# Patient Record
Sex: MALE | Race: ASIAN | NOT HISPANIC OR LATINO | Employment: FULL TIME | URBAN - METROPOLITAN AREA
[De-identification: names, ages, dates, MRNs, and addresses within clinical notes are randomized per-mention and may not be internally consistent; named-entity substitution may affect disease eponyms.]

---

## 2017-05-24 ENCOUNTER — HOSPITAL ENCOUNTER (OUTPATIENT)
Dept: RADIOLOGY | Facility: MEDICAL CENTER | Age: 57
End: 2017-05-24
Attending: PHYSICIAN ASSISTANT
Payer: COMMERCIAL

## 2017-05-24 DIAGNOSIS — R06.02 SOB (SHORTNESS OF BREATH): ICD-10-CM

## 2017-05-24 PROCEDURE — 71020 DX-CHEST-2 VIEWS: CPT

## 2017-06-10 ENCOUNTER — HOSPITAL ENCOUNTER (OUTPATIENT)
Dept: LAB | Facility: MEDICAL CENTER | Age: 57
End: 2017-06-10
Attending: PHYSICIAN ASSISTANT
Payer: COMMERCIAL

## 2017-06-10 LAB
25(OH)D3 SERPL-MCNC: 59 NG/ML (ref 30–100)
ALBUMIN SERPL BCP-MCNC: 4 G/DL (ref 3.2–4.9)
ALBUMIN/GLOB SERPL: 1.1 G/DL
ALP SERPL-CCNC: 56 U/L (ref 30–99)
ALT SERPL-CCNC: 28 U/L (ref 2–50)
ANION GAP SERPL CALC-SCNC: 8 MMOL/L (ref 0–11.9)
APPEARANCE UR: CLEAR
AST SERPL-CCNC: 28 U/L (ref 12–45)
BASOPHILS # BLD AUTO: 0.9 % (ref 0–1.8)
BASOPHILS # BLD: 0.05 K/UL (ref 0–0.12)
BILIRUB SERPL-MCNC: 0.7 MG/DL (ref 0.1–1.5)
BILIRUB UR QL STRIP.AUTO: NEGATIVE
BUN SERPL-MCNC: 21 MG/DL (ref 8–22)
CALCIUM SERPL-MCNC: 9.4 MG/DL (ref 8.5–10.5)
CHLORIDE SERPL-SCNC: 103 MMOL/L (ref 96–112)
CHOLEST SERPL-MCNC: 171 MG/DL (ref 100–199)
CO2 SERPL-SCNC: 27 MMOL/L (ref 20–33)
COLOR UR: NORMAL
CREAT SERPL-MCNC: 0.81 MG/DL (ref 0.5–1.4)
CREAT UR-MCNC: 110.2 MG/DL
CRP SERPL HS-MCNC: 1 MG/L (ref 0–7.5)
CULTURE IF INDICATED INDCX: NO UA CULTURE
EOSINOPHIL # BLD AUTO: 0.6 K/UL (ref 0–0.51)
EOSINOPHIL NFR BLD: 11.1 % (ref 0–6.9)
ERYTHROCYTE [DISTWIDTH] IN BLOOD BY AUTOMATED COUNT: 44.5 FL (ref 35.9–50)
EST. AVERAGE GLUCOSE BLD GHB EST-MCNC: 126 MG/DL
GFR SERPL CREATININE-BSD FRML MDRD: >60 ML/MIN/1.73 M 2
GLOBULIN SER CALC-MCNC: 3.6 G/DL (ref 1.9–3.5)
GLUCOSE SERPL-MCNC: 93 MG/DL (ref 65–99)
GLUCOSE UR STRIP.AUTO-MCNC: NEGATIVE MG/DL
HBA1C MFR BLD: 6 % (ref 0–5.6)
HCT VFR BLD AUTO: 47.7 % (ref 42–52)
HDLC SERPL-MCNC: 57 MG/DL
HGB BLD-MCNC: 15.9 G/DL (ref 14–18)
KETONES UR STRIP.AUTO-MCNC: NEGATIVE MG/DL
LDLC SERPL CALC-MCNC: 102 MG/DL
LEUKOCYTE ESTERASE UR QL STRIP.AUTO: NEGATIVE
LYMPHOCYTES # BLD AUTO: 1.7 K/UL (ref 1–4.8)
LYMPHOCYTES NFR BLD: 31.5 % (ref 22–41)
MANUAL DIFF BLD: NORMAL
MCH RBC QN AUTO: 31.2 PG (ref 27–33)
MCHC RBC AUTO-ENTMCNC: 33.3 G/DL (ref 33.7–35.3)
MCV RBC AUTO: 93.7 FL (ref 81.4–97.8)
MICRO URNS: NORMAL
MICROALBUMIN UR-MCNC: 0.8 MG/DL
MICROALBUMIN/CREAT UR: 7 MG/G (ref 0–30)
MONOCYTES # BLD AUTO: 0.35 K/UL (ref 0–0.85)
MONOCYTES NFR BLD AUTO: 6.5 % (ref 0–13.4)
MORPHOLOGY BLD-IMP: NORMAL
NEUTROPHILS # BLD AUTO: 2.7 K/UL (ref 1.82–7.42)
NEUTROPHILS NFR BLD: 49.1 % (ref 44–72)
NEUTS BAND NFR BLD MANUAL: 0.9 % (ref 0–10)
NITRITE UR QL STRIP.AUTO: NEGATIVE
NRBC # BLD AUTO: 0 K/UL
NRBC BLD AUTO-RTO: 0 /100 WBC
PH UR STRIP.AUTO: 6.5 [PH]
PLATELET # BLD AUTO: 485 K/UL (ref 164–446)
PLATELET BLD QL SMEAR: NORMAL
PMV BLD AUTO: 8.9 FL (ref 9–12.9)
POTASSIUM SERPL-SCNC: 3.9 MMOL/L (ref 3.6–5.5)
PROT SERPL-MCNC: 7.6 G/DL (ref 6–8.2)
PROT UR QL STRIP: NEGATIVE MG/DL
PSA SERPL-MCNC: 0.84 NG/ML (ref 0–4)
RBC # BLD AUTO: 5.09 M/UL (ref 4.7–6.1)
RBC BLD AUTO: PRESENT
RBC UR QL AUTO: NEGATIVE
SMUDGE CELLS BLD QL SMEAR: NORMAL
SODIUM SERPL-SCNC: 138 MMOL/L (ref 135–145)
SP GR UR STRIP.AUTO: 1.02
T3FREE SERPL-MCNC: 3.69 PG/ML (ref 2.4–4.2)
T4 FREE SERPL-MCNC: 0.93 NG/DL (ref 0.53–1.43)
TRIGL SERPL-MCNC: 62 MG/DL (ref 0–149)
TSH SERPL DL<=0.005 MIU/L-ACNC: 1.78 UIU/ML (ref 0.3–3.7)
WBC # BLD AUTO: 5.4 K/UL (ref 4.8–10.8)

## 2017-06-10 PROCEDURE — 85007 BL SMEAR W/DIFF WBC COUNT: CPT

## 2017-06-10 PROCEDURE — 85027 COMPLETE CBC AUTOMATED: CPT

## 2017-06-10 PROCEDURE — 84481 FREE ASSAY (FT-3): CPT

## 2017-06-10 PROCEDURE — 84439 ASSAY OF FREE THYROXINE: CPT

## 2017-06-10 PROCEDURE — 84402 ASSAY OF FREE TESTOSTERONE: CPT

## 2017-06-10 PROCEDURE — 84270 ASSAY OF SEX HORMONE GLOBUL: CPT

## 2017-06-10 PROCEDURE — 84443 ASSAY THYROID STIM HORMONE: CPT

## 2017-06-10 PROCEDURE — 82043 UR ALBUMIN QUANTITATIVE: CPT

## 2017-06-10 PROCEDURE — 84153 ASSAY OF PSA TOTAL: CPT

## 2017-06-10 PROCEDURE — 82570 ASSAY OF URINE CREATININE: CPT

## 2017-06-10 PROCEDURE — 81003 URINALYSIS AUTO W/O SCOPE: CPT

## 2017-06-10 PROCEDURE — 80053 COMPREHEN METABOLIC PANEL: CPT

## 2017-06-10 PROCEDURE — 84403 ASSAY OF TOTAL TESTOSTERONE: CPT

## 2017-06-10 PROCEDURE — 86141 C-REACTIVE PROTEIN HS: CPT

## 2017-06-10 PROCEDURE — 36415 COLL VENOUS BLD VENIPUNCTURE: CPT

## 2017-06-10 PROCEDURE — 83036 HEMOGLOBIN GLYCOSYLATED A1C: CPT

## 2017-06-10 PROCEDURE — 80061 LIPID PANEL: CPT

## 2017-06-10 PROCEDURE — 82306 VITAMIN D 25 HYDROXY: CPT

## 2017-06-12 LAB
SHBG SERPL-SCNC: 37 NMOL/L (ref 11–80)
TESTOST FREE MFR SERPL: 1.7 % (ref 1.6–2.9)
TESTOST FREE SERPL-MCNC: 45 PG/ML (ref 47–244)
TESTOST SERPL-MCNC: 270 NG/DL (ref 300–890)

## 2017-06-15 ENCOUNTER — HOSPITAL ENCOUNTER (OUTPATIENT)
Dept: RADIOLOGY | Facility: MEDICAL CENTER | Age: 57
End: 2017-06-15
Attending: PHYSICIAN ASSISTANT
Payer: COMMERCIAL

## 2017-06-15 DIAGNOSIS — J45.909 UNCOMPLICATED ASTHMA, UNSPECIFIED ASTHMA SEVERITY: ICD-10-CM

## 2017-06-15 DIAGNOSIS — R06.02 SHORTNESS OF BREATH: ICD-10-CM

## 2017-06-15 PROCEDURE — 71020 DX-CHEST-2 VIEWS: CPT

## 2017-09-29 ENCOUNTER — OFFICE VISIT (OUTPATIENT)
Dept: CARDIOLOGY | Facility: MEDICAL CENTER | Age: 57
End: 2017-09-29
Payer: COMMERCIAL

## 2017-09-29 VITALS
OXYGEN SATURATION: 96 % | HEART RATE: 66 BPM | BODY MASS INDEX: 29.64 KG/M2 | SYSTOLIC BLOOD PRESSURE: 120 MMHG | HEIGHT: 61 IN | WEIGHT: 157 LBS | DIASTOLIC BLOOD PRESSURE: 80 MMHG

## 2017-09-29 DIAGNOSIS — I10 ESSENTIAL HYPERTENSION: Chronic | ICD-10-CM

## 2017-09-29 DIAGNOSIS — R07.9 CHEST PAIN, UNSPECIFIED TYPE: ICD-10-CM

## 2017-09-29 PROCEDURE — 99204 OFFICE O/P NEW MOD 45 MIN: CPT | Performed by: INTERNAL MEDICINE

## 2017-09-29 PROCEDURE — 93000 ELECTROCARDIOGRAM COMPLETE: CPT | Performed by: INTERNAL MEDICINE

## 2017-09-29 ASSESSMENT — ENCOUNTER SYMPTOMS
ABDOMINAL PAIN: 0
DEPRESSION: 0
PALPITATIONS: 0
BRUISES/BLEEDS EASILY: 0
SHORTNESS OF BREATH: 0
FALLS: 0
DIZZINESS: 0
PND: 0
LOSS OF CONSCIOUSNESS: 0
ORTHOPNEA: 0

## 2017-09-29 NOTE — LETTER
[facility]   [address], [facility] [address]  [place] [address]  Phone: [phone]  Fax: [phone]              Kb Guy  1960    Encounter Date: 9/29/2017    Lakisha Malagon M.D.          PROGRESS NOTE:  Subjective:   Kb Guy is a 57 y.o. Male With a past medical history significant for hypertension and was referred to cardiology clinic for further management of chest discomfort. Patient reports being in his usual state of health until recently when he presented to the ER with bilateral nonradiating pinching pain that usually occurred with eating spicy foods and spontaneously resolved in about one hour. He was thought to have GERD and he was started on a PPI about 2 weeks ago. Since then he has not had any recurrent symptoms.     Patient reports having significant anxiety with going to doctors visits due to the copayment.  He does not like taking medications.  He was diagnosed with obstructive sleep apnea but patient stopped following up with the doctor.    Past Medical History:   Diagnosis Date   • Hypertension    • Sleep apnea      Past Surgical History:   Procedure Laterality Date   • SINUSCOPE       History reviewed. No pertinent family history.     History   Smoking Status   • Former Smoker   Smokeless Tobacco   • Never Used     Very rare alcohol use. No recreational drug use.    No Known Allergies  Outpatient Encounter Prescriptions as of 9/29/2017   Medication Sig Dispense Refill   • oxycodone immediate-release (ROXICODONE) 5 MG Tab Take 1 Tab by mouth every 8 hours as needed for Severe Pain. 10 Tab 0   • omeprazole (PRILOSEC) 40 MG delayed-release capsule Take 1 Cap by mouth every day. 30 Cap 0   • amlodipine (NORVASC) 10 MG Tab Take 10 mg by mouth every day.     • cyanocobalamin (VITAMIN B12) 1000 MCG Tab Take 1,000 mcg by mouth every day.     • Non Formulary Request Take 1 Tab by mouth every day. Indications: detox multi vit for  "weight loss     • Non Formulary Request Take 1 Tab by mouth every day. Indications: multi vitamin for weight loss     • budesonide-formoterol (SYMBICORT) 160-4.5 MCG/ACT AERO Inhale 2 Puffs by mouth 2 Times a Day.     • aspirin EC (ECOTRIN) 81 MG TBEC Take 81 mg by mouth every day.     • acetaminophen 650 MG Tab Take 650 mg by mouth every 6 hours as needed for Mild Pain (Mild Pain; (Pain scale 1-3); Temp greater than 100.5 F). 30 Tab 0     No facility-administered encounter medications on file as of 9/29/2017.      Review of Systems   Constitutional: Negative for malaise/fatigue.   HENT: Negative.    Respiratory: Negative for shortness of breath.    Cardiovascular: Positive for chest pain. Negative for palpitations, orthopnea, leg swelling and PND.   Gastrointestinal: Negative for abdominal pain.   Musculoskeletal: Negative for falls.   Skin: Negative.    Neurological: Negative for dizziness and loss of consciousness.   Endo/Heme/Allergies: Does not bruise/bleed easily.   Psychiatric/Behavioral: Negative for depression.   All other systems reviewed and are negative.       Objective:   /80   Pulse 66   Ht 1.549 m (5' 1\")   Wt 71.2 kg (157 lb)   SpO2 96%   BMI 29.66 kg/m²      Physical Exam   Constitutional: He is oriented to person, place, and time. No distress.   HENT:   Head: Normocephalic and atraumatic.   Eyes: Conjunctivae are normal.   Neck: Normal range of motion. Neck supple. No JVD present.   Cardiovascular: Normal rate, regular rhythm and normal heart sounds.  Exam reveals no gallop and no friction rub.    No murmur heard.  Pulmonary/Chest: Effort normal and breath sounds normal. No respiratory distress. He has no wheezes. He has no rales.   Abdominal: Soft. There is no tenderness.   Musculoskeletal: He exhibits no edema.   Neurological: He is alert and oriented to person, place, and time.   Skin: Skin is warm and dry. He is not diaphoretic.   Psychiatric: He has a normal mood and affect.   "   Nursing note and vitals reviewed.    Labs from June 2017 was reviewed. Normal hemoglobin. Normal creatinine. .    EKG from today was reviewed and showed normal sinus rhythm at 77 bpm, otherwise normal.    Assessment:     1. Chest pain, unspecified type  EKG   2. Essential hypertension         Medical Decision Making:  Today's Assessment / Status / Plan:     Patient's chest discomfort is atypical in nature. Likely secondary to GERD. However he has risk factors and this could represent angina. I will refer him for an exercise treadmill test for further evaluation.   No changes in medications at this time.  Continue aspirin.    His blood pressure is well-controlled. Continue amlodipine at current dose.    Return to clinic in 3 months or earlier if needed.    Thank you for allowing me to participate in the care of this patient. Please do not hesitate to contact me with any questions.    Lakisha Malagon MD  Cardiologist  [facility]      PLEASE NOTE: This dictation was created using voice recognition software.         No Recipients

## 2017-09-29 NOTE — PROGRESS NOTES
Subjective:   Kb Guy is a 57 y.o. Male With a past medical history significant for hypertension and was referred to cardiology clinic for further management of chest discomfort. Patient reports being in his usual state of health until recently when he presented to the ER with bilateral nonradiating pinching pain that usually occurred with eating spicy foods and spontaneously resolved in about one hour. He was thought to have GERD and he was started on a PPI about 2 weeks ago. Since then he has not had any recurrent symptoms.     Patient reports having significant anxiety with going to doctors visits due to the copayment.  He does not like taking medications.  He was diagnosed with obstructive sleep apnea but patient stopped following up with the doctor.    Past Medical History:   Diagnosis Date   • Hypertension    • Sleep apnea      Past Surgical History:   Procedure Laterality Date   • SINUSCOPE       History reviewed. No pertinent family history.     History   Smoking Status   • Former Smoker   Smokeless Tobacco   • Never Used     Very rare alcohol use. No recreational drug use.    No Known Allergies  Outpatient Encounter Prescriptions as of 9/29/2017   Medication Sig Dispense Refill   • oxycodone immediate-release (ROXICODONE) 5 MG Tab Take 1 Tab by mouth every 8 hours as needed for Severe Pain. 10 Tab 0   • omeprazole (PRILOSEC) 40 MG delayed-release capsule Take 1 Cap by mouth every day. 30 Cap 0   • amlodipine (NORVASC) 10 MG Tab Take 10 mg by mouth every day.     • cyanocobalamin (VITAMIN B12) 1000 MCG Tab Take 1,000 mcg by mouth every day.     • Non Formulary Request Take 1 Tab by mouth every day. Indications: detox multi vit for weight loss     • Non Formulary Request Take 1 Tab by mouth every day. Indications: multi vitamin for weight loss     • budesonide-formoterol (SYMBICORT) 160-4.5 MCG/ACT AERO Inhale 2 Puffs by mouth 2 Times a Day.     • aspirin EC (ECOTRIN) 81 MG TBEC Take 81 mg by  "mouth every day.     • acetaminophen 650 MG Tab Take 650 mg by mouth every 6 hours as needed for Mild Pain (Mild Pain; (Pain scale 1-3); Temp greater than 100.5 F). 30 Tab 0     No facility-administered encounter medications on file as of 9/29/2017.      Review of Systems   Constitutional: Negative for malaise/fatigue.   HENT: Negative.    Respiratory: Negative for shortness of breath.    Cardiovascular: Positive for chest pain. Negative for palpitations, orthopnea, leg swelling and PND.   Gastrointestinal: Negative for abdominal pain.   Musculoskeletal: Negative for falls.   Skin: Negative.    Neurological: Negative for dizziness and loss of consciousness.   Endo/Heme/Allergies: Does not bruise/bleed easily.   Psychiatric/Behavioral: Negative for depression.   All other systems reviewed and are negative.       Objective:   /80   Pulse 66   Ht 1.549 m (5' 1\")   Wt 71.2 kg (157 lb)   SpO2 96%   BMI 29.66 kg/m²     Physical Exam   Constitutional: He is oriented to person, place, and time. No distress.   HENT:   Head: Normocephalic and atraumatic.   Eyes: Conjunctivae are normal.   Neck: Normal range of motion. Neck supple. No JVD present.   Cardiovascular: Normal rate, regular rhythm and normal heart sounds.  Exam reveals no gallop and no friction rub.    No murmur heard.  Pulmonary/Chest: Effort normal and breath sounds normal. No respiratory distress. He has no wheezes. He has no rales.   Abdominal: Soft. There is no tenderness.   Musculoskeletal: He exhibits no edema.   Neurological: He is alert and oriented to person, place, and time.   Skin: Skin is warm and dry. He is not diaphoretic.   Psychiatric: He has a normal mood and affect.   Nursing note and vitals reviewed.    Labs from June 2017 was reviewed. Normal hemoglobin. Normal creatinine. .    EKG from today was reviewed and showed normal sinus rhythm at 77 bpm, otherwise normal.    Assessment:     1. Chest pain, unspecified type  EKG   2. " Essential hypertension         Medical Decision Making:  Today's Assessment / Status / Plan:     Patient's chest discomfort is atypical in nature. Likely secondary to GERD. However he has risk factors and this could represent angina. I will refer him for an exercise treadmill test for further evaluation.   No changes in medications at this time.  Continue aspirin.    His blood pressure is well-controlled. Continue amlodipine at current dose.    Return to clinic in 3 months or earlier if needed.    Thank you for allowing me to participate in the care of this patient. Please do not hesitate to contact me with any questions.    Lakisha Malagon MD  Cardiologist  [facility]      PLEASE NOTE: This dictation was created using voice recognition software.

## 2017-10-02 LAB — EKG IMPRESSION: NORMAL

## 2017-10-09 ENCOUNTER — NON-PROVIDER VISIT (OUTPATIENT)
Dept: CARDIOLOGY | Facility: MEDICAL CENTER | Age: 57
End: 2017-10-09
Attending: INTERNAL MEDICINE
Payer: COMMERCIAL

## 2017-10-09 VITALS
DIASTOLIC BLOOD PRESSURE: 84 MMHG | BODY MASS INDEX: 29.64 KG/M2 | OXYGEN SATURATION: 96 % | WEIGHT: 157 LBS | HEIGHT: 61 IN | SYSTOLIC BLOOD PRESSURE: 118 MMHG | HEART RATE: 76 BPM

## 2017-10-09 DIAGNOSIS — R07.89 OTHER CHEST PAIN: ICD-10-CM

## 2017-10-09 LAB — TREADMILL STRESS: NORMAL

## 2017-10-09 PROCEDURE — 93015 CV STRESS TEST SUPVJ I&R: CPT | Performed by: INTERNAL MEDICINE

## 2017-10-10 ENCOUNTER — TELEPHONE (OUTPATIENT)
Dept: CARDIOLOGY | Facility: MEDICAL CENTER | Age: 57
End: 2017-10-10

## 2017-10-10 NOTE — TELEPHONE ENCOUNTER
----- Message from Neelam Kwan R.N. sent at 10/10/2017  2:19 PM PDT -----      ----- Message -----  From: Lakisha Malagon M.D.  Sent: 10/10/2017   1:52 PM  To: Trina Collazo R.N.    ETT reviewed. Looks normal.   No changes for now.   Thanks  AA

## 2019-01-14 ENCOUNTER — HOSPITAL ENCOUNTER (OUTPATIENT)
Dept: RADIOLOGY | Facility: MEDICAL CENTER | Age: 59
End: 2019-01-14
Attending: PHYSICIAN ASSISTANT
Payer: COMMERCIAL

## 2019-01-14 ENCOUNTER — OFFICE VISIT (OUTPATIENT)
Dept: URGENT CARE | Facility: PHYSICIAN GROUP | Age: 59
End: 2019-01-14
Payer: COMMERCIAL

## 2019-01-14 VITALS
SYSTOLIC BLOOD PRESSURE: 116 MMHG | HEIGHT: 61 IN | HEART RATE: 113 BPM | OXYGEN SATURATION: 91 % | TEMPERATURE: 101.9 F | WEIGHT: 157 LBS | BODY MASS INDEX: 29.64 KG/M2 | RESPIRATION RATE: 15 BRPM | DIASTOLIC BLOOD PRESSURE: 82 MMHG

## 2019-01-14 DIAGNOSIS — R05.9 COUGH: ICD-10-CM

## 2019-01-14 DIAGNOSIS — R50.9 FEVER, UNSPECIFIED FEVER CAUSE: ICD-10-CM

## 2019-01-14 DIAGNOSIS — J18.9 PNEUMONIA OF BOTH LOWER LOBES DUE TO INFECTIOUS ORGANISM: ICD-10-CM

## 2019-01-14 DIAGNOSIS — J45.901 MILD ASTHMA WITH ACUTE EXACERBATION, UNSPECIFIED WHETHER PERSISTENT: ICD-10-CM

## 2019-01-14 PROCEDURE — 99214 OFFICE O/P EST MOD 30 MIN: CPT | Performed by: PHYSICIAN ASSISTANT

## 2019-01-14 PROCEDURE — 71046 X-RAY EXAM CHEST 2 VIEWS: CPT

## 2019-01-14 RX ORDER — CODEINE PHOSPHATE AND GUAIFENESIN 10; 100 MG/5ML; MG/5ML
5 SOLUTION ORAL
Qty: 50 ML | Refills: 0 | Status: SHIPPED | OUTPATIENT
Start: 2019-01-14 | End: 2019-01-24

## 2019-01-14 RX ORDER — AZITHROMYCIN 250 MG/1
TABLET, FILM COATED ORAL
Qty: 6 TAB | Refills: 0 | Status: SHIPPED | OUTPATIENT
Start: 2019-01-14

## 2019-01-14 RX ORDER — AMOXICILLIN AND CLAVULANATE POTASSIUM 875; 125 MG/1; MG/1
1 TABLET, FILM COATED ORAL 2 TIMES DAILY
Qty: 14 TAB | Refills: 0 | Status: SHIPPED | OUTPATIENT
Start: 2019-01-14 | End: 2019-01-21

## 2019-01-14 RX ORDER — VALSARTAN AND HYDROCHLOROTHIAZIDE 160; 12.5 MG/1; MG/1
TABLET, FILM COATED ORAL
COMMUNITY
Start: 2019-01-11

## 2019-01-14 ASSESSMENT — ENCOUNTER SYMPTOMS
EYE DISCHARGE: 0
CHILLS: 0
SORE THROAT: 1
FEVER: 1
VOMITING: 0
MYALGIAS: 1
NECK PAIN: 0
SPUTUM PRODUCTION: 0
WHEEZING: 1
RHINORRHEA: 1
EYE REDNESS: 0
TINGLING: 0
SHORTNESS OF BREATH: 0
DIARRHEA: 0
HEADACHES: 0
DIZZINESS: 0
COUGH: 1

## 2019-01-14 NOTE — LETTER
January 14, 2019         Patient: Kb Guy   YOB: 1960   Date of Visit: 1/14/2019           To Whom it May Concern:    Kb Guy was seen in my clinic on 1/14/2019. Please excuse this patient from work due to recent illness- he may return on Friday only if symptoms have improved.     If you have any questions or concerns, please don't hesitate to call.        Sincerely,           Jose Henriquez P.A.-C.  Electronically Signed

## 2019-01-14 NOTE — PROGRESS NOTES
Subjective:      Kb Guy is a 58 y.o. male who presents with Cough (Onset Thurs.)            Patient is a 55-year-old male who presents to urgent care with worsening cough, wheezing and congestion for the last 4 days.  Patient reports he developed subjective fevers approximately 4 days as well.  He does report history of asthma of which he is continued on his Symbicort however utilizing nebulizer every 12 hours.  He does report utilizing such this morning.  He denies any shortness of breath or chest pain.  He reports that his cough is mainly dry in nature.  He denies prior history of pneumonia although does report history of bronchitis.      Cough   This is a new problem. Episode onset: 4 days ago. The problem occurs every few minutes. The cough is non-productive. Associated symptoms include a fever, myalgias, nasal congestion, postnasal drip, rhinorrhea, a sore throat and wheezing. Pertinent negatives include no chest pain, chills, ear pain, eye redness, headaches, rash or shortness of breath. The symptoms are aggravated by cold air. He has tried OTC cough suppressant for the symptoms. The treatment provided mild relief. His past medical history is significant for asthma and bronchitis. There is no history of pneumonia.       Review of Systems   Constitutional: Positive for fever and malaise/fatigue. Negative for chills.   HENT: Positive for congestion, postnasal drip, rhinorrhea and sore throat. Negative for ear discharge and ear pain.    Eyes: Negative for discharge and redness.   Respiratory: Positive for cough and wheezing. Negative for sputum production and shortness of breath.    Cardiovascular: Negative for chest pain and leg swelling.   Gastrointestinal: Negative for diarrhea and vomiting.   Genitourinary: Negative for dysuria and urgency.   Musculoskeletal: Positive for myalgias. Negative for neck pain.   Skin: Negative for itching and rash.   Neurological: Negative for dizziness, tingling and  "headaches.   All other systems reviewed and are negative.         Objective:     /82   Pulse (!) 113   Temp (!) 38.8 °C (101.9 °F)   Resp 15   Ht 1.549 m (5' 1\")   Wt 71.2 kg (157 lb)   SpO2 91%   BMI 29.66 kg/m²    PMH:  has a past medical history of Hypertension and Sleep apnea.  MEDS:   Current Outpatient Prescriptions:   •  NS SOLN 60 mL with albuterol 2.5 mg/0.5 mL NEBU 5 mL, 5 mg/hr by Nebulization route., Disp: , Rfl:   •  amoxicillin-clavulanate (AUGMENTIN) 875-125 MG Tab, Take 1 Tab by mouth 2 times a day for 7 days., Disp: 14 Tab, Rfl: 0  •  azithromycin (ZITHROMAX) 250 MG Tab, Take 2 tabs today, then 1 tab daily til completed., Disp: 6 Tab, Rfl: 0  •  guaifenesin-codeine (ROBITUSSIN AC) Solution oral solution, Take 5 mL by mouth at bedtime as needed for Cough (May cause sedation) for up to 10 days., Disp: 50 mL, Rfl: 0  •  omeprazole (PRILOSEC) 40 MG delayed-release capsule, Take 1 Cap by mouth every day., Disp: 30 Cap, Rfl: 0  •  amlodipine (NORVASC) 10 MG Tab, Take 10 mg by mouth every day., Disp: , Rfl:   •  cyanocobalamin (VITAMIN B12) 1000 MCG Tab, Take 1,000 mcg by mouth every day., Disp: , Rfl:   •  budesonide-formoterol (SYMBICORT) 160-4.5 MCG/ACT AERO, Inhale 2 Puffs by mouth 2 Times a Day., Disp: , Rfl:   •  aspirin EC (ECOTRIN) 81 MG TBEC, Take 81 mg by mouth every day., Disp: , Rfl:   •  valsartan-hydrochlorothiazide (DIOVAN-HCT) 160-12.5 MG per tablet, , Disp: , Rfl:   •  oxycodone immediate-release (ROXICODONE) 5 MG Tab, Take 1 Tab by mouth every 8 hours as needed for Severe Pain., Disp: 10 Tab, Rfl: 0  •  acetaminophen 650 MG Tab, Take 650 mg by mouth every 6 hours as needed for Mild Pain (Mild Pain; (Pain scale 1-3); Temp greater than 100.5 F)., Disp: 30 Tab, Rfl: 0  •  Non Formulary Request, Take 1 Tab by mouth every day. Indications: detox multi vit for weight loss, Disp: , Rfl:   •  Non Formulary Request, Take 1 Tab by mouth every day. Indications: multi vitamin for weight " loss, Disp: , Rfl:   ALLERGIES: No Known Allergies  SURGHX:   Past Surgical History:   Procedure Laterality Date   • SINUSCOPE       SOCHX:  reports that he has quit smoking. He has never used smokeless tobacco. He reports that he does not drink alcohol or use drugs.  FH: Family history was reviewed, no pertinent findings to report    Physical Exam   Constitutional: He is oriented to person, place, and time. He appears well-developed and well-nourished.   HENT:   Head: Normocephalic and atraumatic.   Mouth/Throat: No oropharyngeal exudate.   Ears- Canals clear- TM- with clear fluid effusions bilaterally.   Pos. PND, with slight erythema- without tonsillar edema or exudate.   Mild discharge noted bilaterally- to nares.      Eyes: Pupils are equal, round, and reactive to light. EOM are normal.   Neck: Normal range of motion. Neck supple.   Cardiovascular: Normal rate and regular rhythm.    No murmur heard.  Pulmonary/Chest: Effort normal. No respiratory distress. He has wheezes.   Diffuse expiratory wheezing slightly diminished at the bases.   Musculoskeletal: Normal range of motion. He exhibits no tenderness.   Lymphadenopathy:     He has no cervical adenopathy.   Neurological: He is alert and oriented to person, place, and time.   Skin: Skin is warm. No rash noted.   Psychiatric: He has a normal mood and affect. His behavior is normal.   Vitals reviewed.              Assessment/Plan:     1. Pneumonia of both lower lobes due to infectious organism (HCC)  - amoxicillin-clavulanate (AUGMENTIN) 875-125 MG Tab; Take 1 Tab by mouth 2 times a day for 7 days.  Dispense: 14 Tab; Refill: 0  - azithromycin (ZITHROMAX) 250 MG Tab; Take 2 tabs today, then 1 tab daily til completed.  Dispense: 6 Tab; Refill: 0    2. Fever, unspecified fever cause  - DX-CHEST-2 VIEWS; Future    3. Cough  - DX-CHEST-2 VIEWS; Future  - guaifenesin-codeine (ROBITUSSIN AC) Solution oral solution; Take 5 mL by mouth at bedtime as needed for Cough (May  "cause sedation) for up to 10 days.  Dispense: 50 mL; Refill: 0    4. Mild asthma with acute exacerbation, unspecified whether persistent      Wanted to repeat nebulizer treatment today of which the patient declined as he \"has this at home \".  I noted that his oxygenation is slightly low today in clinic of which he again declined breathing treatment.  At first patient declined chest x-ray and only \"wanted to be treated \", I expressed my concern regarding missed diagnosis of which the patient was agreeable to have imaging done.    CXR:  Bilateral basilar airspace process, left greater than right.  This finding is most consistent with pneumonia.  Follow-up to complete radiographic resolution is recommended to exclude underlying pulmonary nodule    Encouraged utilization of home nebulizer every 4-6 hours.  We will start the patient on the above treatment to also cover patient for atypicals as well-could also be secondary bacterial pneumonia after influenza- cough medication at nighttime only otherwise encouraged Mucinex for the daytime.  Work note provided.  Patient was giving a copy of chest x-ray results to follow-up with Dr. Ferguson for not only recheck that for further chest x-ray ordering to ensure pneumonia is clearing.      Patient given precautionary s/sx that mandate immediate follow up and evaluation in the ED. Advised of risks of not doing so.    DDX, Supportive care, and indications for immediate follow-up discussed with patient.    Instructed to return to clinic or nearest emergency department if we are not available for any change in condition, further concerns, or worsening of symptoms.    The patient demonstrated a good understanding and agreed with the treatment plan.  Please note that this dictation was created using voice recognition software. I have made every reasonable attempt to correct obvious errors, but I expect that there are errors of grammar and possibly content that I did not discover " before finalizing the note.

## 2020-03-24 ENCOUNTER — OFFICE VISIT (OUTPATIENT)
Dept: URGENT CARE | Facility: CLINIC | Age: 60
End: 2020-03-24
Payer: COMMERCIAL

## 2020-03-24 VITALS
HEIGHT: 61 IN | HEART RATE: 80 BPM | RESPIRATION RATE: 16 BRPM | SYSTOLIC BLOOD PRESSURE: 118 MMHG | TEMPERATURE: 96.5 F | OXYGEN SATURATION: 95 % | DIASTOLIC BLOOD PRESSURE: 64 MMHG | BODY MASS INDEX: 31.15 KG/M2 | WEIGHT: 165 LBS

## 2020-03-24 DIAGNOSIS — J06.9 UPPER RESPIRATORY TRACT INFECTION, UNSPECIFIED TYPE: ICD-10-CM

## 2020-03-24 DIAGNOSIS — J02.9 PHARYNGITIS, UNSPECIFIED ETIOLOGY: ICD-10-CM

## 2020-03-24 LAB
INT CON NEG: NEGATIVE
INT CON POS: POSITIVE
S PYO AG THROAT QL: NEGATIVE

## 2020-03-24 PROCEDURE — 87880 STREP A ASSAY W/OPTIC: CPT | Performed by: PHYSICIAN ASSISTANT

## 2020-03-24 PROCEDURE — 99213 OFFICE O/P EST LOW 20 MIN: CPT | Performed by: PHYSICIAN ASSISTANT

## 2020-03-24 ASSESSMENT — ENCOUNTER SYMPTOMS
CHILLS: 0
SORE THROAT: 1
FEVER: 0
COUGH: 1
SPUTUM PRODUCTION: 0

## 2020-03-24 NOTE — PROGRESS NOTES
Subjective:   Kb Guy is a 59 y.o. male who presents today with   Chief Complaint   Patient presents with   • Pharyngitis     x1,No travel, No direct contact Covid-19.    • Cough       Pharyngitis    This is a new problem. The current episode started yesterday. The problem has been unchanged. There has been no fever. The pain is mild. Associated symptoms include coughing. He has had no exposure to strep. He has tried nothing for the symptoms. The treatment provided no relief.     Patient states because he is at risk of being over 60 he would like to be checked out today.  He has no suspicion of coming into contact with COVID patient.  Patient denies any recent travel history to areas of high coronavirus infection within the past 14 days.  Patient denies any recent contact with patients who have tested positive for COVID-19.    PMH:  has a past medical history of Hypertension and Sleep apnea.  MEDS:   Current Outpatient Medications:   •  NS SOLN 60 mL with albuterol 2.5 mg/0.5 mL NEBU 5 mL, 5 mg/hr by Nebulization route., Disp: , Rfl:   •  valsartan-hydrochlorothiazide (DIOVAN-HCT) 160-12.5 MG per tablet, , Disp: , Rfl:   •  azithromycin (ZITHROMAX) 250 MG Tab, Take 2 tabs today, then 1 tab daily til completed., Disp: 6 Tab, Rfl: 0  •  oxycodone immediate-release (ROXICODONE) 5 MG Tab, Take 1 Tab by mouth every 8 hours as needed for Severe Pain., Disp: 10 Tab, Rfl: 0  •  omeprazole (PRILOSEC) 40 MG delayed-release capsule, Take 1 Cap by mouth every day., Disp: 30 Cap, Rfl: 0  •  amlodipine (NORVASC) 10 MG Tab, Take 10 mg by mouth every day., Disp: , Rfl:   •  cyanocobalamin (VITAMIN B12) 1000 MCG Tab, Take 1,000 mcg by mouth every day., Disp: , Rfl:   •  Non Formulary Request, Take 1 Tab by mouth every day. Indications: detox multi vit for weight loss, Disp: , Rfl:   •  Non Formulary Request, Take 1 Tab by mouth every day. Indications: multi vitamin for weight loss, Disp: , Rfl:   •   "budesonide-formoterol (SYMBICORT) 160-4.5 MCG/ACT AERO, Inhale 2 Puffs by mouth 2 Times a Day., Disp: , Rfl:   •  aspirin EC (ECOTRIN) 81 MG TBEC, Take 81 mg by mouth every day., Disp: , Rfl:   ALLERGIES: No Known Allergies  SURGHX:   Past Surgical History:   Procedure Laterality Date   • SINUSCOPE       SOCHX:  reports that he has quit smoking. He has never used smokeless tobacco. He reports that he does not drink alcohol or use drugs.  FH: Reviewed with patient, not pertinent to this visit.       Review of Systems   Constitutional: Negative for chills and fever.   HENT: Positive for sore throat.    Respiratory: Positive for cough. Negative for sputum production.    All other systems reviewed and are negative.       Objective:   /64 (BP Location: Left arm, Patient Position: Sitting, BP Cuff Size: Adult)   Pulse 80   Temp 35.8 °C (96.5 °F) (Temporal)   Resp 16   Ht 1.549 m (5' 1\")   Wt 74.8 kg (165 lb)   SpO2 95%   BMI 31.18 kg/m²   Physical Exam  Vitals signs and nursing note reviewed.   Constitutional:       General: He is not in acute distress.     Appearance: Normal appearance. He is well-developed and normal weight. He is not ill-appearing or toxic-appearing.   HENT:      Head: Normocephalic and atraumatic.      Right Ear: Hearing, tympanic membrane and ear canal normal.      Left Ear: Hearing, tympanic membrane and ear canal normal.      Mouth/Throat:      Pharynx: Posterior oropharyngeal erythema present. No oropharyngeal exudate.   Eyes:      Pupils: Pupils are equal, round, and reactive to light.   Cardiovascular:      Rate and Rhythm: Normal rate and regular rhythm.      Heart sounds: Normal heart sounds.   Pulmonary:      Effort: Pulmonary effort is normal. No respiratory distress.      Breath sounds: No stridor. No wheezing, rhonchi or rales.   Chest:      Chest wall: No tenderness.   Musculoskeletal:      Comments: Normal movement in all 4 extremities   Lymphadenopathy:      Cervical: No " cervical adenopathy.   Skin:     General: Skin is warm and dry.   Neurological:      Mental Status: He is alert.      Coordination: Coordination normal.   Psychiatric:         Mood and Affect: Mood normal.       STREP A NEG    Assessment/Plan:   Assessment    1. Upper respiratory tract infection, unspecified type    2. Pharyngitis, unspecified etiology  - POCT Rapid Strep A  Reassured patient of low suspicion of coronavirus at this time given clinical presentation and no recent travel history.  Encourage lots of fluids and rest and over-the-counter remedies.  Differential diagnosis, natural history, supportive care, and indications for immediate follow-up discussed.   Patient given instructions and understanding of medications and treatment.    If not improving in 3-5 days, F/U with PCP or return to  if symptoms worsen.    Patient agreeable to plan.      Please note that this dictation was created using voice recognition software. I have made every reasonable attempt to correct obvious errors, but I expect that there are errors of grammar and possibly content that I did not discover before finalizing the note.    Petre Burton PA-C

## 2021-03-14 ENCOUNTER — HOSPITAL ENCOUNTER (OUTPATIENT)
Facility: MEDICAL CENTER | Age: 61
End: 2021-03-14
Attending: PHYSICIAN ASSISTANT
Payer: COMMERCIAL

## 2021-03-14 LAB — COVID ORDER STATUS COVID19: NORMAL

## 2021-03-14 PROCEDURE — U0003 INFECTIOUS AGENT DETECTION BY NUCLEIC ACID (DNA OR RNA); SEVERE ACUTE RESPIRATORY SYNDROME CORONAVIRUS 2 (SARS-COV-2) (CORONAVIRUS DISEASE [COVID-19]), AMPLIFIED PROBE TECHNIQUE, MAKING USE OF HIGH THROUGHPUT TECHNOLOGIES AS DESCRIBED BY CMS-2020-01-R: HCPCS

## 2021-03-14 PROCEDURE — U0005 INFEC AGEN DETEC AMPLI PROBE: HCPCS

## 2021-03-15 DIAGNOSIS — Z23 NEED FOR VACCINATION: ICD-10-CM

## 2021-03-15 LAB
SARS-COV-2 RNA RESP QL NAA+PROBE: DETECTED
SPECIMEN SOURCE: ABNORMAL

## 2024-06-28 LAB
AMB EXT EGFR NON-AA: 66.4
AMB EXT HGBA1C: 6.8 %

## 2024-07-12 ENCOUNTER — OFFICE VISIT (OUTPATIENT)
Dept: INTERNAL MEDICINE CLINIC | Facility: CLINIC | Age: 64
End: 2024-07-12
Payer: MEDICARE

## 2024-07-12 VITALS
HEIGHT: 68.75 IN | OXYGEN SATURATION: 98 % | BODY MASS INDEX: 39.3 KG/M2 | TEMPERATURE: 98 F | SYSTOLIC BLOOD PRESSURE: 130 MMHG | RESPIRATION RATE: 24 BRPM | DIASTOLIC BLOOD PRESSURE: 82 MMHG | HEART RATE: 89 BPM | WEIGHT: 265.38 LBS

## 2024-07-12 DIAGNOSIS — E66.01 SEVERE OBESITY (BMI 35.0-39.9) WITH COMORBIDITY (HCC): ICD-10-CM

## 2024-07-12 DIAGNOSIS — N40.1 BPH ASSOCIATED WITH NOCTURIA: ICD-10-CM

## 2024-07-12 DIAGNOSIS — R35.1 BPH ASSOCIATED WITH NOCTURIA: ICD-10-CM

## 2024-07-12 DIAGNOSIS — Z12.5 SCREENING FOR MALIGNANT NEOPLASM OF PROSTATE: ICD-10-CM

## 2024-07-12 DIAGNOSIS — R42 VERTIGO: ICD-10-CM

## 2024-07-12 DIAGNOSIS — Z86.718 HISTORY OF DVT (DEEP VEIN THROMBOSIS): ICD-10-CM

## 2024-07-12 DIAGNOSIS — R07.89 LEFT-SIDED CHEST WALL PAIN: Primary | ICD-10-CM

## 2024-07-12 DIAGNOSIS — E11.9 CONTROLLED TYPE 2 DIABETES MELLITUS WITHOUT COMPLICATION, WITHOUT LONG-TERM CURRENT USE OF INSULIN (HCC): ICD-10-CM

## 2024-07-12 DIAGNOSIS — Z86.711 HISTORY OF PULMONARY EMBOLISM: ICD-10-CM

## 2024-07-12 DIAGNOSIS — I10 PRIMARY HYPERTENSION: Chronic | ICD-10-CM

## 2024-07-12 PROBLEM — D64.9 ANEMIA: Status: ACTIVE | Noted: 2021-11-14

## 2024-07-12 PROBLEM — E53.8 COBALAMIN DEFICIENCY: Status: RESOLVED | Noted: 2021-11-14 | Resolved: 2024-07-12

## 2024-07-12 PROBLEM — E53.8 COBALAMIN DEFICIENCY: Status: ACTIVE | Noted: 2021-11-14

## 2024-07-12 PROBLEM — N20.0 KIDNEY STONE: Status: ACTIVE | Noted: 2023-03-27

## 2024-07-12 PROBLEM — I82.409 RECURRENT DEEP VEIN THROMBOSIS (HCC): Status: RESOLVED | Noted: 2022-09-19 | Resolved: 2024-07-12

## 2024-07-12 PROBLEM — D64.9 ANEMIA: Status: RESOLVED | Noted: 2021-11-14 | Resolved: 2024-07-12

## 2024-07-12 PROBLEM — N20.0 KIDNEY STONE: Status: RESOLVED | Noted: 2023-03-27 | Resolved: 2024-07-12

## 2024-07-12 PROBLEM — I82.409 RECURRENT DEEP VEIN THROMBOSIS (HCC): Status: ACTIVE | Noted: 2022-09-19

## 2024-07-12 PROCEDURE — 3075F SYST BP GE 130 - 139MM HG: CPT | Performed by: INTERNAL MEDICINE

## 2024-07-12 PROCEDURE — 3079F DIAST BP 80-89 MM HG: CPT | Performed by: INTERNAL MEDICINE

## 2024-07-12 PROCEDURE — 3008F BODY MASS INDEX DOCD: CPT | Performed by: INTERNAL MEDICINE

## 2024-07-12 PROCEDURE — 3044F HG A1C LEVEL LT 7.0%: CPT | Performed by: INTERNAL MEDICINE

## 2024-07-12 PROCEDURE — 99204 OFFICE O/P NEW MOD 45 MIN: CPT | Performed by: INTERNAL MEDICINE

## 2024-07-12 RX ORDER — TAMSULOSIN HYDROCHLORIDE 0.4 MG/1
0.4 CAPSULE ORAL DAILY
COMMUNITY
End: 2024-07-12

## 2024-07-12 RX ORDER — GLUCOSAM/CHON-MSM1/C/MANG/BOSW 500-416.6
1 TABLET ORAL 2 TIMES DAILY
COMMUNITY

## 2024-07-12 RX ORDER — BLOOD SUGAR DIAGNOSTIC
1 STRIP MISCELLANEOUS 2 TIMES DAILY
COMMUNITY

## 2024-07-12 RX ORDER — BLOOD-GLUCOSE METER
1 EACH MISCELLANEOUS 2 TIMES DAILY
COMMUNITY

## 2024-07-12 RX ORDER — MECLIZINE HYDROCHLORIDE 25 MG/1
25 TABLET ORAL 3 TIMES DAILY PRN
COMMUNITY
Start: 2024-05-24 | End: 2024-07-12

## 2024-07-12 RX ORDER — LISINOPRIL 40 MG/1
1 TABLET ORAL DAILY
COMMUNITY
End: 2024-07-12

## 2024-07-12 RX ORDER — METOPROLOL SUCCINATE 50 MG/1
1 TABLET, EXTENDED RELEASE ORAL DAILY
COMMUNITY
End: 2024-07-12

## 2024-07-12 RX ORDER — LISINOPRIL 40 MG/1
40 TABLET ORAL DAILY
Qty: 90 TABLET | Refills: 1 | Status: SHIPPED | OUTPATIENT
Start: 2024-07-12

## 2024-07-12 RX ORDER — CYCLOBENZAPRINE HCL 5 MG
5 TABLET ORAL 3 TIMES DAILY PRN
Qty: 30 TABLET | Refills: 0 | Status: SHIPPED | OUTPATIENT
Start: 2024-07-12

## 2024-07-12 RX ORDER — APIXABAN 5 MG/1
5 TABLET, FILM COATED ORAL 2 TIMES DAILY
COMMUNITY
Start: 2024-02-01 | End: 2024-07-12

## 2024-07-12 RX ORDER — TAMSULOSIN HYDROCHLORIDE 0.4 MG/1
0.4 CAPSULE ORAL DAILY
Qty: 90 CAPSULE | Refills: 1 | Status: SHIPPED | OUTPATIENT
Start: 2024-07-12

## 2024-07-12 RX ORDER — IBUPROFEN 600 MG/1
600 TABLET ORAL EVERY 8 HOURS PRN
COMMUNITY
Start: 2024-06-28 | End: 2024-07-28

## 2024-07-12 RX ORDER — METOPROLOL SUCCINATE 50 MG/1
50 TABLET, EXTENDED RELEASE ORAL DAILY
Qty: 90 TABLET | Refills: 1 | Status: SHIPPED | OUTPATIENT
Start: 2024-07-12

## 2024-07-12 RX ORDER — MECLIZINE HYDROCHLORIDE 25 MG/1
25 TABLET ORAL 3 TIMES DAILY PRN
Qty: 30 TABLET | Refills: 2 | Status: SHIPPED | OUTPATIENT
Start: 2024-07-12

## 2024-07-12 NOTE — PATIENT INSTRUCTIONS
- Comience con un relajante muscular, ciclobenzaprina, para el dolor de pecho.  [place] 1 tableta 3 veces al día según sea necesario. Savita medicamento puede causarle somnolencia, así que no conduzca después de tomarlo.  - Meclizina (medicamento para los mareos) reabastecido  - Otros medicamentos reabastecidos  - Agendar akbar con Neurología para tus mareos:  [address]  [place] [address] [phone]  - Agendar radiografía de tórax y costillas.  Llame a la programación central al [phone] para programar.  - Realizarse análisis de osman y orina en ayunas (sólo agua y medicamentos).  Nuestro laboratorio está abierto de lunes a sábado.    - Start muscle relaxant, cyclobenzaprine, for your chest pain.  Take 1 tablet 3 times daily as needed. This medication can make you drowsy, so do not drive after taking it.  - Meclizine (dizziness medication) refilled  - Other medications refilled  - Schedule appointment with Neurology for your dizziness:  [address] [place] [id number] [phone]  - Schedule x-ray of chest and ribs.  Call central scheduling at [phone] to schedule.  - Get blood and urine tests done when fasting (water and medications only).  Our lab is open Monday - Saturday.    It was a pleasure seeing you in the clinic today.  Thank you for choosing the [facility] [place] office for your healthcare needs. Please call at [phone] with any questions or concerns.    Nupur Cartagena MD

## 2024-07-12 NOTE — PROGRESS NOTES
Gilberto Wolf is a 64 year old male.   HPI:   Patient presents to establish care and follow up on recent hospitalization at [facility].  Turkish video  used for this visit.    He was admitted at [facility] with pleuritic left-sided chest pain.  Had cardiac work up including stress test, which was unremarkable.  It was felt patient's pain was more musculoskeletal.  He was discharged home.  He still feels some pain in left chest wall when sneezing or coughing. Some tenderness to palpation of left chest wall. Very occasional cough now.  No fevers/chills/sweats.  No shortness of breath.    Other chronic issues:  Hypertension - at goal blood pressure.  DM II - on metformin therapy.  A1c of 6.8% in hospital.  History of PE and DVT - on chronic anticoagulation with Eliquis.  BPH with nocturia - on tamsulosin therapy.  Looking at outside records he does have history of nephrolithiasis.  Vertigo - this appears to be a chronic issue as he was already on PRN meclizine when he was living in [place] per outside records.  He especially feels dizzy/lightheaded posturally/when bending over.  Body mass index is 39.48 kg/m².    Past medical, family, surgical and social history were reviewed and updated in chart.  REVIEW OF SYSTEMS:   GENERAL/ const: no fevers/chills, no unintentional weight loss  SKIN: denies any unusual skin lesions  EYES:no vision problems  HEENT: denies sinus pain or sinus tenderness  LUNGS: denies shortness of breath   CARDIOVASCULAR: denies chest pain  GI: denies nausea/emesis/ abdominal pain diarrhea constipation  : denies dysuria   MUSCULOSKELETAL: left chest wall pain  NEURO: denies headaches  PSYCHIATRIC: denies issues  ENDOCRINE: no hot/cold intolerance  ALLERGY: Not on File  PAST HISTORY:     Current Outpatient Medications:     ibuprofen 600 MG Oral Tab, Take 1 tablet (600 mg total) by mouth every 8 (eight) hours as needed., Disp: , Rfl:     apixaban (ELIQUIS) 5 MG Oral  Tab, Take 1 tablet (5 mg total) by mouth 2 (two) times daily., Disp: 180 tablet, Rfl: 1    lisinopril 40 MG Oral Tab, Take 1 tablet (40 mg total) by mouth daily., Disp: 90 tablet, Rfl: 1    meclizine 25 MG Oral Tab, Take 1 tablet (25 mg total) by mouth 3 (three) times daily as needed for Dizziness., Disp: 30 tablet, Rfl: 2    metFORMIN 500 MG Oral Tab, Take 1 tablet (500 mg total) by mouth 2 (two) times daily with meals., Disp: 180 tablet, Rfl: 1    metoprolol succinate ER 50 MG Oral Tablet 24 Hr, Take 1 tablet (50 mg total) by mouth daily., Disp: 90 tablet, Rfl: 1    NIFEdipine ER 60 MG Oral Tablet 24 Hr, Take 1 tablet (60 mg total) by mouth daily., Disp: 90 tablet, Rfl: 1    tamsulosin 0.4 MG Oral Cap, Take 1 capsule (0.4 mg total) by mouth daily., Disp: 90 capsule, Rfl: 1    cyclobenzaprine 5 MG Oral Tab, Take 1 tablet (5 mg total) by mouth 3 (three) times daily as needed for Muscle spasms., Disp: 30 tablet, Rfl: 0    Blood Glucose Monitoring Suppl (TRUE METRIX AIR GLUCOSE METER) Does not apply Device, 1 strip by In Vitro route 2 (two) times daily., Disp: , Rfl:     Glucose Blood (TRUE METRIX PRO BLOOD GLUCOSE) In Vitro Strip, 1 strip by In Vitro route 2 (two) times daily., Disp: , Rfl:     TRUEplus Lancets 33G Does not apply Misc, 1 kit 2 (two) times daily., Disp: , Rfl:   Medical:  has a past medical history of Anemia (11/14/2021), Cobalamin deficiency (11/14/2021), Kidney stone (03/27/2023), and Recurrent deep vein thrombosis (HCC) (09/19/2022).  Surgical:  has no past surgical history on file.  Family: family history is not on file.  Social:  reports that he has never smoked. He has never been exposed to tobacco smoke. He has never used smokeless tobacco.  Wt Readings from Last 6 Encounters:   07/12/24 265 lb 6.4 oz (120.4 kg)     EXAM:   /82 (BP Location: Right arm, Patient Position: Sitting, Cuff Size: large)   Pulse 89   Temp 98 °F (36.7 °C) (Temporal)   Resp 24   Ht 5' 8.75\" (1.746 m)   Wt 265  lb 6.4 oz (120.4 kg)   SpO2 98%   BMI 39.48 kg/m²   GENERAL: Alert and oriented, well developed, well nourished,in no apparent distress  SKIN: no rashes,no suspicious lesions  HEENT: atraumatic, PERRLA, EOMI, normal lid and conjunctiva  NECK: supple, no jvd, no thyromegaly  LUNGS: clear to auscultation bilaterally, no wheezing/rubs  CARDIO: RRR without murmurs.  No clubbing, cyanosis or edema.  GI: soft non tender nondistended no hepatosplenomegaly, bowel sounds throughout  NEURO: CN II-XII intact, 5/5 strength all extremities  Bilateral barefoot skin diabetic exam is normal, visualized feet and the appearance is normal.  Bilateral monofilament/sensation of both feet is normal.  Pulsation pedal pulse exam of both lower legs/feet is normal as well.  MS: Full ROM  PSYCH: pleasant, appropriate mood and affect  ASSESSMENT AND PLAN:   1. Left-sided chest wall pain  Patient admitted to [facility] with chest pain/left-sided chest wall pain.  Had normal nuclear stress tests.  Symptoms felt to be musculoskeletal.  Still with left lower chest wall pain, especially with deep inspiration, coughing, palpation.  Chest x-ray normal at Counts include [facility] beds at the [facility].  Will check rib x-ray.  Will start on PRN cyclobenzaprine.  Advised patient not to drive after taking this medication due to potential drowsiness.  - XR RIBS WITH CHEST (3 VIEWS), LEFT  (CPT=71101); Future  - cyclobenzaprine 5 MG Oral Tab; Take 1 tablet (5 mg total) by mouth 3 (three) times daily as needed for Muscle spasms.  Dispense: 30 tablet; Refill: 0    2. Vertigo  This appears to be a chronic issue, more acute symptoms leading up to hospitalization but hew as on chronic meclizine before moving up here from [place] per chart review.  He endorses postural dizziness/lightheadedness.  Will continue PRN meclizine for now.  Will also have patient follow up with Neurology.  - meclizine 25 MG Oral Tab; Take 1 tablet (25 mg total) by mouth 3 (three) times daily as needed  for Dizziness.  Dispense: 30 tablet; Refill: 2  - Neuro Referral - In Network    3. Primary hypertension  At goal blood pressure.  Normal creatinine/electrolytes in hospital.  Will continue current medications - refilled as below.  - lisinopril 40 MG Oral Tab; Take 1 tablet (40 mg total) by mouth daily.  Dispense: 90 tablet; Refill: 1  - metoprolol succinate ER 50 MG Oral Tablet 24 Hr; Take 1 tablet (50 mg total) by mouth daily.  Dispense: 90 tablet; Refill: 1  - NIFEdipine ER 60 MG Oral Tablet 24 Hr; Take 1 tablet (60 mg total) by mouth daily.  Dispense: 90 tablet; Refill: 1    4. Controlled type 2 diabetes mellitus without complication, without long-term current use of insulin ([facility])  Good control, A1c 6.8% in hospital.  Will continue metformin 500 mg BID.    - Microalb/Creat Ratio, Random Urine; Future  - Lipid Panel; Future  - metFORMIN 500 MG Oral Tab; Take 1 tablet (500 mg total) by mouth 2 (two) times daily with meals.  Dispense: 180 tablet; Refill: 1    5. History of DVT (deep vein thrombosis)  6. History of pulmonary embolism  Prior history of PE, DVT - on chronic anticoagulation.  Eliquis refilled.  - apixaban (ELIQUIS) 5 MG Oral Tab; Take 1 tablet (5 mg total) by mouth 2 (two) times daily.  Dispense: 180 tablet; Refill: 1    7. BPH associated with nocturia  Stable on tamsulosin - refilled.  - tamsulosin 0.4 MG Oral Cap; Take 1 capsule (0.4 mg total) by mouth daily.  Dispense: 90 capsule; Refill: 1    8. Screening for malignant neoplasm of prostate  PSA ordered.  - PSA Total, Screen; Future    9. Severe obesity (BMI 35.0-39.9) with comorbidity (HCC)  Body mass index is 39.48 kg/m².  Focus on lifestyle modification.    Patient Care Team:  Nupur Cartagena MD as PCP - General (Internal Medicine)  The patient indicates understanding of these issues and agrees to the plan.  The patient is asked to return to clinic in 3-6 months for follow up on chronic issues/MA Supervisit, or earlier if acute issues  arise.    Nupur Cartagena MD

## 2024-07-13 ENCOUNTER — LAB ENCOUNTER (OUTPATIENT)
Dept: LAB | Age: 64
End: 2024-07-13
Attending: INTERNAL MEDICINE
Payer: MEDICARE

## 2024-07-13 DIAGNOSIS — E11.9 CONTROLLED TYPE 2 DIABETES MELLITUS WITHOUT COMPLICATION, WITHOUT LONG-TERM CURRENT USE OF INSULIN (HCC): ICD-10-CM

## 2024-07-13 DIAGNOSIS — Z12.5 SCREENING FOR MALIGNANT NEOPLASM OF PROSTATE: ICD-10-CM

## 2024-07-13 PROBLEM — E66.01 SEVERE OBESITY (BMI 35.0-39.9) WITH COMORBIDITY (HCC): Status: ACTIVE | Noted: 2024-07-13

## 2024-07-13 LAB
CHOLEST SERPL-MCNC: 119 MG/DL (ref ?–200)
COMPLEXED PSA SERPL-MCNC: 0.64 NG/ML (ref ?–4)
CREAT UR-SCNC: 100 MG/DL
FASTING PATIENT LIPID ANSWER: YES
HDLC SERPL-MCNC: 42 MG/DL (ref 40–59)
LDLC SERPL CALC-MCNC: 49 MG/DL (ref ?–100)
MICROALBUMIN UR-MCNC: 0.5 MG/DL
MICROALBUMIN/CREAT 24H UR-RTO: 5 UG/MG (ref ?–30)
NONHDLC SERPL-MCNC: 77 MG/DL (ref ?–130)
TRIGL SERPL-MCNC: 171 MG/DL (ref 30–149)
VLDLC SERPL CALC-MCNC: 24 MG/DL (ref 0–30)

## 2024-07-13 PROCEDURE — 36415 COLL VENOUS BLD VENIPUNCTURE: CPT

## 2024-07-13 PROCEDURE — 80061 LIPID PANEL: CPT

## 2024-07-13 PROCEDURE — 82043 UR ALBUMIN QUANTITATIVE: CPT

## 2024-07-13 PROCEDURE — 82570 ASSAY OF URINE CREATININE: CPT

## 2024-09-12 ENCOUNTER — OFFICE VISIT (OUTPATIENT)
Dept: NEUROLOGY | Facility: CLINIC | Age: 64
End: 2024-09-12
Payer: MEDICARE

## 2024-09-12 VITALS — WEIGHT: 264.38 LBS | OXYGEN SATURATION: 96 % | RESPIRATION RATE: 20 BRPM | BODY MASS INDEX: 39 KG/M2

## 2024-09-12 DIAGNOSIS — R42 DIZZINESS: Primary | ICD-10-CM

## 2024-09-12 DIAGNOSIS — H93.13 TINNITUS OF BOTH EARS: ICD-10-CM

## 2024-09-12 PROCEDURE — 99204 OFFICE O/P NEW MOD 45 MIN: CPT | Performed by: OTHER

## 2024-09-12 NOTE — PROGRESS NOTES
HPI:    Patient ID: Gilberto Wolf is a 64 year old male.  PCP: Dr Cartagena    HPI  Patient is a 64-year-old male with history of hypertension and DVT on apixaban who presented for evaluation of dizziness.  Patient reports he has been having episodes of dizziness and vertigo sensation for the last 5 years.  He reports this started after he slipped and fell hitting his head at work.  Episode varies in frequency and may sometimes occur once a week or maybe twice a week, dizzy lightheaded feeling or sometime it is spinning sensation sided that is associated with buzzing in both ears. Dizziness is positional worse when he stands up from sitting position.  He states his hearing is fine. PCP gave him Meclizine which is using as needed.  Reports his blood pressure fluctuates symptoms of high or low and recently was admitted at [facility] for chest pain but the cardiac stress test came back fine.        HISTORY:  Past Medical History:    Anemia    Cobalamin deficiency    Kidney stone    Recurrent deep vein thrombosis (HCC)      No past surgical history on file.   No family history on file.   Social History     Socioeconomic History    Marital status: OTHER   Tobacco Use    Smoking status: Never     Passive exposure: Never    Smokeless tobacco: Never   Vaping Use    Vaping status: Never Used     Social Determinants of Health     Food Insecurity: Low Risk  (6/27/2024)    Received from [facility] Food Security     Within the past 12 months, the food you bought just didn't last and you didn't have money to get more.: 3     Within the past 12 months, you worried that your food would run out before you got money to buy more.: 3   Transportation Needs: Not At Risk (6/27/2024)    Received from [facility] Transportation Needs     In the past 12 months, has lack of reliable transportation kept you from medical appointments, meetings, work or from getting things needed for daily living?: No   Housing  Stability: Not At Risk (6/27/2024)    Received from [facility]     What is your living situation today?: I have a steady place to live     Think about the place you live. Do you have problems with any of the following?: None of the above        Review of Systems   Constitutional: Negative.    HENT:  Positive for tinnitus.    Eyes: Negative.    Respiratory: Negative.     Cardiovascular: Negative.    Gastrointestinal: Negative.    Endocrine: Negative.    Genitourinary: Negative.    Musculoskeletal: Negative.    Skin: Negative.    Allergic/Immunologic: Negative.    Neurological:  Positive for dizziness.   Hematological: Negative.    Psychiatric/Behavioral: Negative.     All other systems reviewed and are negative.           Current Outpatient Medications   Medication Sig Dispense Refill    apixaban (ELIQUIS) 5 MG Oral Tab Take 1 tablet (5 mg total) by mouth 2 (two) times daily. 180 tablet 1    lisinopril 40 MG Oral Tab Take 1 tablet (40 mg total) by mouth daily. 90 tablet 1    meclizine 25 MG Oral Tab Take 1 tablet (25 mg total) by mouth 3 (three) times daily as needed for Dizziness. 30 tablet 2    metFORMIN 500 MG Oral Tab Take 1 tablet (500 mg total) by mouth 2 (two) times daily with meals. 180 tablet 1    metoprolol succinate ER 50 MG Oral Tablet 24 Hr Take 1 tablet (50 mg total) by mouth daily. 90 tablet 1    NIFEdipine ER 60 MG Oral Tablet 24 Hr Take 1 tablet (60 mg total) by mouth daily. 90 tablet 1    tamsulosin 0.4 MG Oral Cap Take 1 capsule (0.4 mg total) by mouth daily. 90 capsule 1    Blood Glucose Monitoring Suppl (TRUE METRIX AIR GLUCOSE METER) Does not apply Device 1 strip by In Vitro route 2 (two) times daily.      Glucose Blood (TRUE METRIX PRO BLOOD GLUCOSE) In Vitro Strip 1 strip by In Vitro route 2 (two) times daily.      TRUEplus Lancets 33G Does not apply Misc 1 kit 2 (two) times daily.       Allergies:Not on File  PHYSICAL EXAM:   Physical Exam  Blood pressure 134/70, pulse 79,  resp. rate 20, weight 264 lb 6.4 oz (119.9 kg), SpO2 96%.           9/12/2024 9:18 AM 9/12/2024 9:54 AM 9/12/2024 9:55 AM   Ortho /70 155/79 165/90   BP Location Left arm Left arm Left arm   Patient Position Sitting Sitting Standing   Cuff Size large large large   Orthostatic Pulse 79 74    Resp 20              General Appearance: Well nourished, well developed, no apparent distress.     HEENT: Normocephalic and atraumatic. Normal sclera. Moist mucus membrane  Neck: Normal range of motion. Neck supple.  Cardiovascular: Normal rate, regular rhythm and normal heart sounds.    Pulmonary/Chest: Effort normal and breath sounds normal.   Abdominal: Soft. Bowel sounds are normal.   Skin: dry, clean and intact  Ext: peripheral pulses present  Psych: normal mood and affect    Neurological:  Patient is awake, alert and oriented to person, place and time   Normal memory, attention/concentration, speech and language.    Cranial Nerves:   II: Visual fields: normal  III: Pupils: equal, round, reactive to light  III,IV,VI: Extra Ocular Movements: intact  V: Facial sensation: intact  VII: Facial strength: intact  VIII: Hearing: intact  IX: Palate: intact  XI: Shoulder shrug: intact  XII: Tongue movement: normal    Motor: Normal tone. Strength is  5 out of 5 in all extremities bilaterally.  DTR: present and depressed throughout    Sensory: Sensory examination is normal to light touch and pinprick     Coordination: Finger-to-nose, heel-to-shin, and rapid alternating movements are normal bilaterally without evidence of dysmetria.    Gait: steady, mild difficulty with tandem walking          TESTS/IMAGING:         ASSESSMENT/PLAN:       ICD-10-CM    1. Dizziness  R42 MRI BRAIN (CPT=70551)      2. Tinnitus of both ears  H93.13 MRI BRAIN (CPT=70551)          Dizziness and tinnitus of both ears intermittent and worse with positional change  Etiology unclear, non specific, nedication side effect ( antihypertensive and tamusolin)  BPPV. However Orthostatic BP negative      Obtain MRI brain to rule out any intracranial abnormality   Refer to physical therapy for [place] testing and for vestibular exercises  Monitor BP at home and management per primary care    Thank you for allowing us to participate in your patient's care.         Lg Mancia MD   [facility] [place]      This note was prepared using Dragon Medical voice recognition dictation software. As a result errors may occur. When identified these errors have been corrected. While every attempt is made to correct errors during dictation discrepancies may still exist           Meds This Visit:  Requested Prescriptions      No prescriptions requested or ordered in this encounter       Imaging & Referrals:  None     ID#[id number]

## 2024-09-12 NOTE — PROGRESS NOTES
Patient reports vertigo symptoms for the past few years.  fell in May and 1 month ago. Fell 2 times last year.  gets dizzy when gets up from sitting. Also  has buzzing in ear.

## 2024-09-12 NOTE — PATIENT INSTRUCTIONS
After your visit at the [place] office  today, please direct any follow up questions or medication needs to the staff in our [place] office so that your concerns may be promptly addressed.  We are available through [email or web address] or at the numbers below:    The phone number is:   [phone], option 1    The fax number is:  [phone]    Your pharmacy should also send any requests electronically to the [place] office.       Refill policies:    Allow 2-3 business days for refills; controlled substances may take longer.  Contact your pharmacy at least 5 days prior to running out of medication and have them send an electronic request or submit request through the “request refill” option in your ehealthtracker account.  Refills are not addressed on weekends; covering physicians do not authorize routine medications on weekends.  No narcotics or controlled substances are refilled after noon on Fridays or by on call physicians.  By law, narcotics must be electronically prescribed.  A 30 day supply with no refills is the maximum allowed.  If your prescription is due for a refill, you may be due for a follow up appointment.  To best provide you care, patients receiving routine medications need to be seen at least once a year.  Patients receiving narcotic/controlled substance medications need to be seen at least once every 3 months.  In the event that your preferred pharmacy does not have the requested medication in stock (e.g. Backordered), it is your responsibility to find another pharmacy that has the requested medication available.  We will gladly send a new prescription to that pharmacy at your request.    Scheduling Tests:    If your physician has ordered radiology tests such as MRI or CT scans, please contact Central Scheduling at [phone] right away to schedule the test.  Once scheduled, the [facility] Centralized Referral Team will work with your insurance carrier to obtain pre-certification or prior authorization.   Depending on your insurance carrier, approval may take 3-10 days.  It is highly recommended patients assure they have received an authorization before having a test performed.  If test is done without insurance authorization, patient may be responsible for the entire amount billed.      Precertification and Prior Authorizations:  If your physician has recommended that you have a procedure or additional testing performed the [facility] Centralized Referral Team will contact your insurance carrier to obtain pre-certification or prior authorization.    You are strongly encouraged to contact your insurance carrier to verify that your procedure/test has been approved and is a COVERED benefit.  Although the [facility] Centralized Referral Team does its due diligence, the insurance carrier gives the disclaimer that \"Although the procedure is authorized, this does not guarantee payment.\"    Ultimately the patient is responsible for payment.   Thank you for your understanding in this matter.  Paperwork Completion:  If you require FMLA or disability paperwork for your recovery, please make sure to either drop it off or have it faxed to our office at [phone]. Be sure the form has your name and date of birth on it.  The form will be faxed to our Forms Department and they will complete it for you.  There is a 25$ fee for all forms that need to be filled out.  Please be aware there is a 10-14 day turnaround time.  You will need to sign a release of information (FAITH) form if your paperwork does not come with one.  You may call the Forms Department with any questions at [phone].  Their fax number is [phone].

## 2024-10-01 ENCOUNTER — HOSPITAL ENCOUNTER (OUTPATIENT)
Dept: MRI IMAGING | Age: 64
Discharge: HOME | End: 2024-10-01
Attending: Other
Payer: MEDICARE

## 2024-10-01 DIAGNOSIS — H93.13 TINNITUS OF BOTH EARS: ICD-10-CM

## 2024-10-01 DIAGNOSIS — R42 DIZZINESS: ICD-10-CM

## 2024-10-01 PROCEDURE — 70551 MRI BRAIN STEM W/O DYE: CPT | Performed by: OTHER

## 2024-10-07 ENCOUNTER — OFFICE VISIT (OUTPATIENT)
Dept: INTERNAL MEDICINE CLINIC | Facility: CLINIC | Age: 64
End: 2024-10-07
Payer: MEDICARE

## 2024-10-07 VITALS
OXYGEN SATURATION: 98 % | WEIGHT: 263.38 LBS | HEIGHT: 68.5 IN | TEMPERATURE: 98 F | HEART RATE: 82 BPM | SYSTOLIC BLOOD PRESSURE: 136 MMHG | BODY MASS INDEX: 39.46 KG/M2 | DIASTOLIC BLOOD PRESSURE: 86 MMHG | RESPIRATION RATE: 16 BRPM

## 2024-10-07 DIAGNOSIS — N40.1 BPH ASSOCIATED WITH NOCTURIA: Chronic | ICD-10-CM

## 2024-10-07 DIAGNOSIS — R35.1 BPH ASSOCIATED WITH NOCTURIA: Chronic | ICD-10-CM

## 2024-10-07 DIAGNOSIS — I10 PRIMARY HYPERTENSION: Primary | Chronic | ICD-10-CM

## 2024-10-07 DIAGNOSIS — R42 VERTIGO: Chronic | ICD-10-CM

## 2024-10-07 DIAGNOSIS — E11.9 CONTROLLED TYPE 2 DIABETES MELLITUS WITHOUT COMPLICATION, WITHOUT LONG-TERM CURRENT USE OF INSULIN (HCC): Chronic | ICD-10-CM

## 2024-10-07 DIAGNOSIS — Z86.711 HISTORY OF PULMONARY EMBOLISM: ICD-10-CM

## 2024-10-07 DIAGNOSIS — H53.9 VISION CHANGES: ICD-10-CM

## 2024-10-07 DIAGNOSIS — Z86.718 HISTORY OF DVT (DEEP VEIN THROMBOSIS): ICD-10-CM

## 2024-10-07 PROCEDURE — 3008F BODY MASS INDEX DOCD: CPT | Performed by: INTERNAL MEDICINE

## 2024-10-07 PROCEDURE — 3079F DIAST BP 80-89 MM HG: CPT | Performed by: INTERNAL MEDICINE

## 2024-10-07 PROCEDURE — 99214 OFFICE O/P EST MOD 30 MIN: CPT | Performed by: INTERNAL MEDICINE

## 2024-10-07 PROCEDURE — 3061F NEG MICROALBUMINURIA REV: CPT | Performed by: INTERNAL MEDICINE

## 2024-10-07 PROCEDURE — 3075F SYST BP GE 130 - 139MM HG: CPT | Performed by: INTERNAL MEDICINE

## 2024-10-07 RX ORDER — LISINOPRIL 40 MG/1
40 TABLET ORAL DAILY
Qty: 90 TABLET | Refills: 3 | Status: SHIPPED | OUTPATIENT
Start: 2024-10-07

## 2024-10-07 RX ORDER — TAMSULOSIN HYDROCHLORIDE 0.4 MG/1
0.4 CAPSULE ORAL DAILY
Qty: 90 CAPSULE | Refills: 3 | Status: SHIPPED | OUTPATIENT
Start: 2024-10-07

## 2024-10-07 RX ORDER — METOPROLOL SUCCINATE 50 MG/1
50 TABLET, EXTENDED RELEASE ORAL DAILY
Qty: 90 TABLET | Refills: 3 | Status: SHIPPED | OUTPATIENT
Start: 2024-10-07

## 2024-10-07 NOTE — PROGRESS NOTES
Gilberto Wolf is a 64 year old male.   HPI:     Patient presents for follow up on chronic medical issues.  Albanian video  used for this visit.    Has been having trouble with vision in right eye.  Had surgery years ago - was told his vision would improve.  Vision continues to worsen in right eye.    Other chronic issues:  Hypertension - elevated systolic blood pressure today.  States he ran out of medications.  DM II - good control, A1c 6.8% this summer.  BPH - stable symptoms.  History of PE and DVT - on chronic Eliquis.  Vertigo - could not tolerate MRI last week.  Still with intermittent vertigo.    Past medical, family, surgical and social history were reviewed as listed in the chart, and are unchanged from previous visit on 7/12/2024  REVIEW OF SYSTEMS:   GENERAL/ const: no fevers/chills, no unintentional weight loss  SKIN: denies any unusual skin lesions  EYES:right eye vision problems  HEENT: denies sinus pain or sinus tenderness  LUNGS: denies shortness of breath   CARDIOVASCULAR: denies chest pain  GI: denies nausea/emesis/ abdominal pain diarrhea constipation  : denies dysuria   MUSCULOSKELETAL: no acute arthralgias  NEURO: dizziness/vertigo  PSYCHIATRIC: denies issues  ENDOCRINE: no hot/cold intolerance  ALLERGY: Not on File  PAST HISTORY:     Current Outpatient Medications:     apixaban (ELIQUIS) 5 MG Oral Tab, Take 1 tablet (5 mg total) by mouth 2 (two) times daily., Disp: 180 tablet, Rfl: 3    lisinopril 40 MG Oral Tab, Take 1 tablet (40 mg total) by mouth daily., Disp: 90 tablet, Rfl: 3    metFORMIN 500 MG Oral Tab, Take 1 tablet (500 mg total) by mouth 2 (two) times daily with meals., Disp: 180 tablet, Rfl: 1    metoprolol succinate ER 50 MG Oral Tablet 24 Hr, Take 1 tablet (50 mg total) by mouth daily., Disp: 90 tablet, Rfl: 3    NIFEdipine ER 60 MG Oral Tablet 24 Hr, Take 1 tablet (60 mg total) by mouth daily., Disp: 90 tablet, Rfl: 3    tamsulosin 0.4 MG Oral Cap, Take 1 capsule  (0.4 mg total) by mouth daily., Disp: 90 capsule, Rfl: 3    meclizine 25 MG Oral Tab, Take 1 tablet (25 mg total) by mouth 3 (three) times daily as needed for Dizziness., Disp: 30 tablet, Rfl: 2    Blood Glucose Monitoring Suppl (TRUE METRIX AIR GLUCOSE METER) Does not apply Device, 1 strip by In Vitro route 2 (two) times daily., Disp: , Rfl:     Glucose Blood (TRUE METRIX PRO BLOOD GLUCOSE) In Vitro Strip, 1 strip by In Vitro route 2 (two) times daily., Disp: , Rfl:     TRUEplus Lancets 33G Does not apply Misc, 1 kit 2 (two) times daily., Disp: , Rfl:   Medical:  has a past medical history of Anemia (11/14/2021), Cobalamin deficiency (11/14/2021), Kidney stone (03/27/2023), and Recurrent deep vein thrombosis (HCC) (09/19/2022).  Surgical:  has no past surgical history on file.  Family: family history is not on file.  Social:  reports that he has never smoked. He has never been exposed to tobacco smoke. He has never used smokeless tobacco.  Wt Readings from Last 6 Encounters:   10/07/24 263 lb 6.4 oz (119.5 kg)   09/12/24 264 lb 6.4 oz (119.9 kg)   07/12/24 265 lb 6.4 oz (120.4 kg)     EXAM:   /86 (BP Location: Left arm, Patient Position: Sitting, Cuff Size: large)   Pulse 82   Temp 98.1 °F (36.7 °C) (Temporal)   Resp 16   Ht 5' 8.5\" (1.74 m)   Wt 263 lb 6.4 oz (119.5 kg)   SpO2 98%   BMI 39.47 kg/m²   GENERAL: Alert and oriented, well developed, well nourished,in no apparent distress  HEENT: atraumatic, PERRLA, EOMI, normal lid and conjunctiva  LUNGS: clear to auscultation bilaterally, no wheezing/rubs  CARDIO: RRR without murmurs.  No clubbing, cyanosis or edema.  GI: soft non tender nondistended no hepatosplenomegaly, bowel sounds throughout  PSYCH: pleasant, appropriate mood and affect  ASSESSMENT AND PLAN:   1. Primary hypertension  Repeat reading at goal.  Continue lisinopril 40 mg every day, metoprolol succinate 50 mg every day, nifedipine ER 60 mg every day.  - Comp Metabolic Panel (14);  Future  - CBC With Differential With Platelet; Future  - lisinopril 40 MG Oral Tab; Take 1 tablet (40 mg total) by mouth daily.  Dispense: 90 tablet; Refill: 3  - metoprolol succinate ER 50 MG Oral Tablet 24 Hr; Take 1 tablet (50 mg total) by mouth daily.  Dispense: 90 tablet; Refill: 3  - NIFEdipine ER 60 MG Oral Tablet 24 Hr; Take 1 tablet (60 mg total) by mouth daily.  Dispense: 90 tablet; Refill: 3    2. Controlled type 2 diabetes mellitus without complication, without long-term current use of insulin ([facility])  Good control, A1c of 6.8%.  Will continue metformin 500 mg BID.  Referred to Ophthalmology.  Up to date with foot exam, microalbumin.  Not on statin therapy.  - Comp Metabolic Panel (14); Future  - Hemoglobin A1C; Future  - Lipid Panel; Future  - Microalb/Creat Ratio, Random Urine; Future  - metFORMIN 500 MG Oral Tab; Take 1 tablet (500 mg total) by mouth 2 (two) times daily with meals.  Dispense: 180 tablet; Refill: 1  - Ophthalmology Referral - External    3. BPH associated with nocturia  Stable on tamsulosin therapy.  Refill sent in.  - tamsulosin 0.4 MG Oral Cap; Take 1 capsule (0.4 mg total) by mouth daily.  Dispense: 90 capsule; Refill: 3    4. History of DVT (deep vein thrombosis)  5. History of pulmonary embolism  On chronic anticoagulation; will continue Eliquis.  - apixaban (ELIQUIS) 5 MG Oral Tab; Take 1 tablet (5 mg total) by mouth 2 (two) times daily.  Dispense: 180 tablet; Refill: 3    6. Vertigo  Chronic/persistent issue.  Saw Neurology (Dr. Mancia).  MRI was done last week, but patient did not tolerate it.  May need MRI with anesthesia - will discuss with Neurology.    7. Vision changes  Patient with worsening vision in right eye.  States he had surgery in the eye years.  Wearing OTC reading glasses - not helping.  Recommend follow up with Ophthalmology - referral entered for Dr. Buenrostro.  - Ophthalmology Referral - External    Patient Care Team:  Nupur Cartagena MD as PCP - General  (Internal Medicine)  Lg Mancia MD as Consulting Physician (NEUROLOGY)  The patient indicates understanding of these issues and agrees to the plan.  The patient is asked to return to clinic in 3-4 months for follow up on chronic issues/MA Supervisit, or earlier if acute issues arise.    Nupur Cartagena MD

## 2024-10-07 NOTE — PATIENT INSTRUCTIONS
- Rellené todos teresa medicamentos a [facility] en [place].  Envié 1 año de recargas para que no te quedes sin medicamentos  - Programe dakota akbar con el oftalmólogo para un examen de la vista para diabéticos y para discutir teresa problemas de visión:  Dr. Dawson Buenrostro  [address] [place] [address].  [id number]  [place], [place] [id number] [phone]  - Le preguntará al especialista Dr. Mancia qué quiere hacer en cuanto a barnes prueba de resonancia magnética y teresa síntomas de vértigo.  - Cedric un seguimiento conmigo en enero/febrero para barnes visita anual de bienestar de Medicare; Hágase análisis de osman y orina en ayunas al menos 2 días antes de esta akbar.    - I refilled all your medications to [facility] in [place].  I sent 1 year of refills so you should not run out of medications  - Schedule appointment with the eye specialist for diabetic eye exam and to discuss your vision problems:  Dr. Elsie Buenrostro  [address] [place] [address]  [id number]  [place], [place]  [id number] [phone]  - Will ask the specialist Dr. Mancia what she wants to do as far as your MRI test and vertigo symptoms.  - Follow up with me in January/February for you Annual Medicare Wellness Visit; get fasting blood and urine tests done at least 2 days before this appointment.    It was a pleasure seeing you in the clinic today.  Thank you for choosing the [facility] office for your healthcare needs. Please call at [phone] with any questions or concerns.    Nupur Cartagena MD

## 2024-12-27 ENCOUNTER — TELEPHONE (OUTPATIENT)
Dept: INTERNAL MEDICINE CLINIC | Facility: CLINIC | Age: 64
End: 2024-12-27

## 2025-03-24 NOTE — Clinical Note
REFERRAL APPROVAL NOTICE         Sent on March 24, 2025                   Kb Guy  [address] [place] [address]                   Dear Mr. Guy,    After a careful review of the medical information and benefit coverage, [facility] has processed your referral. See below for additional details.    If applicable, you must be actively enrolled with your insurance for coverage of the authorized service. If you have any questions regarding your coverage, please contact your insurance directly.    REFERRAL INFORMATION   Referral #:  [id number]  Referred-To Department    Referred-By Provider:  Hematology Oncology    JACKSON Macedo   Oncology [facility]      [address]  [place] [address]  [phone] [address] [facility]  [address]  [place] [id number] [phone]    Referral Start Date:  03/18/2025  Referral End Date:   03/18/2026           SCHEDULING  If you do not already have an appointment, please call [phone] to make an appointment.   MORE INFORMATION  As a reminder, [facility] ownership has changed, meaning this location is now owned and operated by [facility]. As such, we want to clarify that our patients should expect to receive two separate bills for the services received at [facility] - one representing the [facility] facility fees as the owner of the establishment, and the other to represent the physician's services and subsequent fees. You can speak with your insurance carrier for a pricing estimate by calling the customer service number on the back of your card and ask about charges for a hospital outpatient visit.  If you do not already have a [employer] account, sign up at: [email or web address]  You can access your medical information, make appointments, see lab results, billing information, and more.  If you have questions regarding this referral, please contact  the [facility] Referrals department at:             [phone]. Monday - Friday  7:30AM - 5:00PM.      Sincerely,  [facility]

## 2025-03-31 ENCOUNTER — OFFICE VISIT (OUTPATIENT)
Dept: INTERNAL MEDICINE CLINIC | Facility: CLINIC | Age: 65
End: 2025-03-31
Payer: MEDICARE

## 2025-03-31 VITALS
BODY MASS INDEX: 39.28 KG/M2 | DIASTOLIC BLOOD PRESSURE: 90 MMHG | HEART RATE: 75 BPM | SYSTOLIC BLOOD PRESSURE: 150 MMHG | OXYGEN SATURATION: 97 % | TEMPERATURE: 98 F | HEIGHT: 68.5 IN | WEIGHT: 262.19 LBS

## 2025-03-31 DIAGNOSIS — E11.9 CONTROLLED TYPE 2 DIABETES MELLITUS WITHOUT COMPLICATION, WITHOUT LONG-TERM CURRENT USE OF INSULIN (HCC): Chronic | ICD-10-CM

## 2025-03-31 DIAGNOSIS — Z86.718 HISTORY OF DVT (DEEP VEIN THROMBOSIS): ICD-10-CM

## 2025-03-31 DIAGNOSIS — Z12.11 SCREENING FOR MALIGNANT NEOPLASM OF COLON: ICD-10-CM

## 2025-03-31 DIAGNOSIS — E66.01 SEVERE OBESITY (BMI 35.0-39.9) WITH COMORBIDITY (HCC): ICD-10-CM

## 2025-03-31 DIAGNOSIS — Z00.00 WELCOME TO MEDICARE PREVENTIVE VISIT: Primary | ICD-10-CM

## 2025-03-31 DIAGNOSIS — Z23 NEED FOR PNEUMOCOCCAL VACCINATION: ICD-10-CM

## 2025-03-31 DIAGNOSIS — H53.9 VISION CHANGES: ICD-10-CM

## 2025-03-31 DIAGNOSIS — R35.1 BPH ASSOCIATED WITH NOCTURIA: Chronic | ICD-10-CM

## 2025-03-31 DIAGNOSIS — N40.1 BPH ASSOCIATED WITH NOCTURIA: Chronic | ICD-10-CM

## 2025-03-31 DIAGNOSIS — I10 PRIMARY HYPERTENSION: Chronic | ICD-10-CM

## 2025-03-31 DIAGNOSIS — Z86.711 HISTORY OF PULMONARY EMBOLISM: ICD-10-CM

## 2025-03-31 DIAGNOSIS — R42 VERTIGO: Chronic | ICD-10-CM

## 2025-03-31 LAB — HEMOGLOBIN A1C: 6.6 % (ref 4.3–5.6)

## 2025-03-31 NOTE — PATIENT INSTRUCTIONS
- La diabetes es estable.  Continuará con metformina en la misma dosis; se envió el reabastecimiento  - Todos teresa otros medicamentos tienen resurtidos archivados hasta octubre.  Llame o vaya a [facility] para que le resurtan estos medicamentos.  - Asegúrate de jazzmine todos tus medicamentos todos los días.  - Cedric un seguimiento con el oftalmólogo para el examen de la vista para diabéticos y teresa problemas de visión:  Dr. Porter Gooden [facility].  [id number]  [place], [place] [address] [phone]  - Para barnes Eliquis, visite el sitio web [email or web address] o llame al [phone] para buscar asistencia al paciente.  - Cedric un seguimiento en el consultorio si barnes dolor en el pecho regresa  - De lo contrario, programe dakota visita al consultorio matthew 3 meses (julio) para que podamos volver a controlar barnes presión arterial.    - Diabetes is stable.  Will continue metformin at same dose - refill sent in  - All your other medications have refills on file until October.  Call or go to [facility] to get these medications refilled  - Make sure to take all your medications every day  - Follow up with eye doctor for diabetic eye exam and your vision problems:  Dr. Porter Gooden [facility]  [id number]  [place]  [address]  [phone]  - For your Eliquis, go to the website [employer] or call [phone] to look into patient assistance.  - Follow up in office if your chest pain comes back  - Otherwise schedule office visit for 3 months (July) so we can re-check your blood pressure.    It was a pleasure seeing you in the clinic today.  Thank you for choosing the [facility] office for your healthcare needs. Please call at [phone] with any questions or concerns.    Nupur Cartagena MD

## 2025-03-31 NOTE — PROGRESS NOTES
Subjective:   Gilberto Wolf is a 64 year old male who presents for a MA AHA (Medicare Advantage Annual Health Assessment) and IPPE (Initial Preventative Physical Exam) (Welcome to Medicare- < 12 months on Medicare) and scheduled follow up of multiple significant but stable problems.  Sammarinese video  used for this visit.  Preventative health - due for updated labs. Body mass index is 39.29 kg/m².  Hypertension - blood pressure elevated. He states he ran out of 1 or 2 medications (cannot remember what medications they were).  DM II - good control, on metformin therapy.  Vision changes - still with some vision issues in right eye.  Did not see Ophthalmology yet.  BPH with nocturia - stable symptoms.  History of PE, history of DVT - anticoagulated on Eliquis therapy.  Vertigo - chronic/intermittent issue; no symptoms currently.    History/Other:   Fall Risk Assessment:   He has been screened for Falls and is low risk.      Cognitive Assessment:   He had a completely normal cognitive assessment - see flowsheet entries       Functional Ability/Status:   Gilberto Wolf has some abnormal functions as listed below:  He has difficulties Affording Meds based on screening of functional status. He has Vision problems based on screening of functional status.       Depression Screening (PHQ):  PHQ-2 SCORE: 1  , done 3/31/2025   Little interest or pleasure in doing things: 1    Last [place] Suicide Screening on 3/31/2025 was No Risk.            Advanced Directives:   He does NOT have a Living Will. [Do you have a living will?: No]  He does NOT have a Power of  for Health Care. [Do you have a healthcare power of ?: No]  Discussed Advance Care Planning with patient (and family/surrogate if present). Standard forms made available to patient in After Visit Summary.      Patient Active Problem List   Diagnosis    Vertigo    Controlled type 2 diabetes mellitus without complication, without long-term  current use of insulin (HCC)    Primary hypertension    History of DVT (deep vein thrombosis)    History of pulmonary embolism    BPH associated with nocturia    Severe obesity (BMI 35.0-39.9) with comorbidity (HCC)     Allergies:  He has no allergies on file.    Current Medications:  Outpatient Medications Marked as Taking for the 3/31/25 encounter (Office Visit) with Nupur Cartagena MD   Medication Sig    apixaban (ELIQUIS) 5 MG Oral Tab Take 1 tablet (5 mg total) by mouth 2 (two) times daily.    lisinopril 40 MG Oral Tab Take 1 tablet (40 mg total) by mouth daily.    metFORMIN 500 MG Oral Tab Take 1 tablet (500 mg total) by mouth 2 (two) times daily with meals.    metoprolol succinate ER 50 MG Oral Tablet 24 Hr Take 1 tablet (50 mg total) by mouth daily.    NIFEdipine ER 60 MG Oral Tablet 24 Hr Take 1 tablet (60 mg total) by mouth daily.    tamsulosin 0.4 MG Oral Cap Take 1 capsule (0.4 mg total) by mouth daily.    meclizine 25 MG Oral Tab Take 1 tablet (25 mg total) by mouth 3 (three) times daily as needed for Dizziness.    Blood Glucose Monitoring Suppl (TRUE METRIX AIR GLUCOSE METER) Does not apply Device 1 strip by In Vitro route 2 (two) times daily.    Glucose Blood (TRUE METRIX PRO BLOOD GLUCOSE) In Vitro Strip 1 strip by In Vitro route 2 (two) times daily.    TRUEplus Lancets 33G Does not apply Misc 1 kit 2 (two) times daily.       Medical History:  He  has a past medical history of Anemia (11/14/2021), Cobalamin deficiency (11/14/2021), Kidney stone (03/27/2023), and Recurrent deep vein thrombosis (HCC) (09/19/2022).  Surgical History:  He  has no past surgical history on file.   Family History:  His family history is not on file.  Social History:  He  reports that he has never smoked. He has never been exposed to tobacco smoke. He has never used smokeless tobacco. No history on file for alcohol use and drug use.    Tobacco:  He has never smoked tobacco.    CAGE Alcohol Screen:   CAGE screening score of  0 on 3/31/2025, showing low risk of alcohol abuse.      Patient Care Team:  Nupur Cartagena MD as PCP - General (Internal Medicine)  Lg Mancia MD as Consulting Physician (NEUROLOGY)    Review of Systems  GENERAL: feels well otherwise  SKIN: denies any unusual skin lesions  EYES: poor vision right eye  HEENT: denies nasal congestion, sinus pain or ST  LUNGS: denies shortness of breath with exertion  CARDIOVASCULAR: denies chest pain on exertion  GI: denies abdominal pain, denies heartburn  : no complaint of urinary incontinence  MUSCULOSKELETAL: denies acute back pain  NEURO: denies headaches  PSYCHE: denies depression or anxiety  HEMATOLOGIC: denies hx of anemia  ENDOCRINE: denies thyroid history  ALL/ASTHMA: denies hx of allergy or asthma    Objective:   Physical Exam  /90 (BP Location: Right arm, Patient Position: Sitting, Cuff Size: large)   Pulse 75   Temp 98.2 °F (36.8 °C) (Temporal)   Ht 5' 8.5\" (1.74 m)   Wt 262 lb 3.2 oz (118.9 kg)   SpO2 97%   BMI 39.29 kg/m²  Estimated body mass index is 39.29 kg/m² as calculated from the following:    Height as of this encounter: 5' 8.5\" (1.74 m).    Weight as of this encounter: 262 lb 3.2 oz (118.9 kg).  Medicare Hearing Assessment:   Hearing Screening    Screening Method: Whisper Test  Whisper Test Result: Pass     Visual Acuity:   Right Eye Visual Acuity: Uncorrected Right Eye Chart Acuity: 20/40   Left Eye Visual Acuity: Uncorrected Left Eye Chart Acuity: 20/25     Both Eyes Chart Acuity: 20/25   Able To Tolerate Visual Acuity: Yes    GENERAL: Alert and oriented, well developed, well nourished,in no apparent distress  SKIN: no rashes,no suspicious lesions  HEENT: atraumatic, PERRLA, EOMI, normal lid and conjunctiva  NECK: supple, no jvd, no thyromegaly  LUNGS: clear to auscultation bilaterally, no wheezing/rubs  CARDIO: RRR without murmurs.  No clubbing, cyanosis or edema.  GI: soft non tender nondistended no hepatosplenomegaly, bowel sounds  throughout  NEURO: CN II-XII intact, 5/5 strength all extremities  Bilateral barefoot skin diabetic exam is normal, visualized feet and the appearance is normal.  Bilateral monofilament/sensation of both feet is normal.  Pulsation pedal pulse exam of both lower legs/feet is normal as well.  MS: Full ROM  PSYCH: pleasant, appropriate mood and affect      Assessment & Plan:   Gilberto Wolf is a 64 year old male who presents for a Medicare Assessment.   1. Welcome to Medicare preventive visit  2. Screening for malignant neoplasm of colon  3. Need for pneumococcal vaccination  Age appropriate health guidance and counseling provided.  Screening lab orders in system, patient will get done when fasting.  Body mass index is 39.29 kg/m².  Will think about Prevnar 20.  Due for colon cancer screening.     4. Severe obesity (BMI 35.0-39.9) with comorbidity (HCC)  Body mass index is 39.29 kg/m².  Comorbidities of hypertension, diabetes.  Focus on diet and exercise.    5. Primary hypertension  Above goal.  Ran out of 1 or 2 medications.  I advised him I sent in 1 year of refills for all antihypertensive medications, he just needs [facility]  Continue lisinopril 40 mg every day, metoprolol succinate 50 mg every day, nifedipine ER 60 mg every day for now.  Stressed importance of medication compliance.    6. Controlled type 2 diabetes mellitus without complication, without long-term current use of insulin ([facility])  Good control - A1c of 6.6% today in office. Will continue metformin 500 mg BID.  Microalbumin order in system.  Not on statin therapy - lipid panel in system, patient will get done when fasting.  Reminded patient to schedule eye exam, new referral re-entered.  - POC Hemoglobin A1C  - Ophthalmology Referral - External  - metFORMIN 500 MG Oral Tab; Take 1 tablet (500 mg total) by mouth 2 (two) times daily with meals.  Dispense: 180 tablet; Refill: 1    7. Vision changes  Right eye.  Again reminded  patient to follow up with ophthalmologist.  - Ophthalmology Referral - External    8. BPH associated with nocturia  Stable symptoms.  Continue tamsulosin 0.4 mg every day.    9. History of DVT (deep vein thrombosis)  10. History of pulmonary embolism  Anticoagulated on Eliquis; it is quite expensive.  Phone number and website provided for patient assistance; otherwise can refer to Hematology/Coumadin clinic to possibly start warfarin.    11. Vertigo  Chronic issue, intermittent.  On PRN meclizine.  Previously had MRI ordered by Neurology but could not tolerate MRI scanner.      The patient indicates understanding of these issues and agrees to the plan.  Reinforced healthy diet, lifestyle, and exercise.      Return in about 3 months (around 6/30/2025).     Nupur Cartagena MD, 3/31/2025     Supplementary Documentation:   General Health:  In the past six months, have you lost more than 10 pounds without trying?: 2 - No  Has your appetite been poor?: No  Type of Diet: Diabetic  How does the patient maintain a good energy level?: Daily Walks  How would you describe your daily physical activity?: Light  How would you describe your current health state?: Good  How do you maintain positive mental well-being?: Visiting Family  On a scale of 0 to 10, with 0 being no pain and 10 being severe pain, what is your pain level?: 5 - (Moderate)  In the past six months, have you experienced urine leakage?: 1-Yes  At any time do you feel concerned for the safety/well-being of yourself and/or your children, in your home or elsewhere?: No  Have you had any immunizations at another office such as Influenza, Hepatitis B, Tetanus, or Pneumococcal?: No    Health Maintenance   Topic Date Due    Diabetes Care Dilated Eye Exam  Never done    Colorectal Cancer Screening  Never done    Pneumococcal Vaccine: 50+ Years (1 of 2 - PCV) Never done    Zoster Vaccines (1 of 2) Never done    COVID-19 Vaccine (1 - 2024-25 season) Never done    Influenza  Vaccine (1) Never done    Diabetes Care A1C  12/28/2024    Annual Well Visit  Never done    Annual Depression Screening  Never done    Fall Risk Screening (Annual)  Never done    Diabetes Care: Foot Exam (Annual)  01/01/2025    Diabetes Care: Microalb/Creat Ratio (Annual)  01/01/2025    Diabetes Care: GFR  06/28/2025    PSA  07/13/2026    Meningococcal B Vaccine  Aged Out

## 2025-04-01 ENCOUNTER — LAB ENCOUNTER (OUTPATIENT)
Dept: LAB | Age: 65
End: 2025-04-01
Attending: INTERNAL MEDICINE
Payer: MEDICARE

## 2025-04-01 DIAGNOSIS — I10 PRIMARY HYPERTENSION: Chronic | ICD-10-CM

## 2025-04-01 DIAGNOSIS — E11.9 CONTROLLED TYPE 2 DIABETES MELLITUS WITHOUT COMPLICATION, WITHOUT LONG-TERM CURRENT USE OF INSULIN (HCC): Chronic | ICD-10-CM

## 2025-04-01 PROBLEM — R80.9 CONTROLLED TYPE 2 DIABETES MELLITUS WITH MICROALBUMINURIA, WITHOUT LONG-TERM CURRENT USE OF INSULIN (HCC): Status: ACTIVE | Noted: 2024-07-12

## 2025-04-01 PROBLEM — E11.29 CONTROLLED TYPE 2 DIABETES MELLITUS WITH MICROALBUMINURIA, WITHOUT LONG-TERM CURRENT USE OF INSULIN (HCC): Status: ACTIVE | Noted: 2024-07-12

## 2025-04-01 LAB
ALBUMIN SERPL-MCNC: 4.4 G/DL (ref 3.2–4.8)
ALBUMIN/GLOB SERPL: 1.5 {RATIO} (ref 1–2)
ALP LIVER SERPL-CCNC: 63 U/L
ALT SERPL-CCNC: 52 U/L
ANION GAP SERPL CALC-SCNC: 9 MMOL/L (ref 0–18)
AST SERPL-CCNC: 33 U/L (ref ?–34)
BASOPHILS # BLD AUTO: 0.09 X10(3) UL (ref 0–0.2)
BASOPHILS NFR BLD AUTO: 1 %
BILIRUB SERPL-MCNC: 0.6 MG/DL (ref 0.2–1.1)
BUN BLD-MCNC: 13 MG/DL (ref 9–23)
CALCIUM BLD-MCNC: 9.6 MG/DL (ref 8.7–10.6)
CHLORIDE SERPL-SCNC: 108 MMOL/L (ref 98–112)
CHOLEST SERPL-MCNC: 112 MG/DL (ref ?–200)
CO2 SERPL-SCNC: 21 MMOL/L (ref 21–32)
CREAT BLD-MCNC: 1.09 MG/DL
CREAT UR-SCNC: 104.6 MG/DL
EGFRCR SERPLBLD CKD-EPI 2021: 75 ML/MIN/1.73M2 (ref 60–?)
EOSINOPHIL # BLD AUTO: 0.4 X10(3) UL (ref 0–0.7)
EOSINOPHIL NFR BLD AUTO: 4.4 %
ERYTHROCYTE [DISTWIDTH] IN BLOOD BY AUTOMATED COUNT: 12.7 %
FASTING PATIENT LIPID ANSWER: YES
FASTING STATUS PATIENT QL REPORTED: YES
GLOBULIN PLAS-MCNC: 3 G/DL (ref 2–3.5)
GLUCOSE BLD-MCNC: 119 MG/DL (ref 70–99)
HCT VFR BLD AUTO: 49.7 %
HDLC SERPL-MCNC: 42 MG/DL (ref 40–59)
HGB BLD-MCNC: 16.1 G/DL
IMM GRANULOCYTES # BLD AUTO: 0.09 X10(3) UL (ref 0–1)
IMM GRANULOCYTES NFR BLD: 1 %
LDLC SERPL CALC-MCNC: 53 MG/DL (ref ?–100)
LYMPHOCYTES # BLD AUTO: 2.24 X10(3) UL (ref 1–4)
LYMPHOCYTES NFR BLD AUTO: 24.8 %
MCH RBC QN AUTO: 28.1 PG (ref 26–34)
MCHC RBC AUTO-ENTMCNC: 32.4 G/DL (ref 31–37)
MCV RBC AUTO: 86.7 FL
MICROALBUMIN UR-MCNC: 7.4 MG/DL
MICROALBUMIN/CREAT 24H UR-RTO: 70.7 UG/MG (ref ?–30)
MONOCYTES # BLD AUTO: 0.68 X10(3) UL (ref 0.1–1)
MONOCYTES NFR BLD AUTO: 7.5 %
NEUTROPHILS # BLD AUTO: 5.52 X10 (3) UL (ref 1.5–7.7)
NEUTROPHILS # BLD AUTO: 5.52 X10(3) UL (ref 1.5–7.7)
NEUTROPHILS NFR BLD AUTO: 61.3 %
NONHDLC SERPL-MCNC: 70 MG/DL (ref ?–130)
OSMOLALITY SERPL CALC.SUM OF ELEC: 287 MOSM/KG (ref 275–295)
PLATELET # BLD AUTO: 159 10(3)UL (ref 150–450)
POTASSIUM SERPL-SCNC: 4.2 MMOL/L (ref 3.5–5.1)
PROT SERPL-MCNC: 7.4 G/DL (ref 5.7–8.2)
RBC # BLD AUTO: 5.73 X10(6)UL
SODIUM SERPL-SCNC: 138 MMOL/L (ref 136–145)
TRIGL SERPL-MCNC: 87 MG/DL (ref 30–149)
VLDLC SERPL CALC-MCNC: 13 MG/DL (ref 0–30)
WBC # BLD AUTO: 9 X10(3) UL (ref 4–11)

## 2025-04-01 PROCEDURE — 80053 COMPREHEN METABOLIC PANEL: CPT

## 2025-04-01 PROCEDURE — 36415 COLL VENOUS BLD VENIPUNCTURE: CPT

## 2025-04-01 PROCEDURE — 82570 ASSAY OF URINE CREATININE: CPT

## 2025-04-01 PROCEDURE — 82043 UR ALBUMIN QUANTITATIVE: CPT

## 2025-04-01 PROCEDURE — 80061 LIPID PANEL: CPT

## 2025-04-01 PROCEDURE — 85025 COMPLETE CBC W/AUTO DIFF WBC: CPT

## 2025-04-08 ENCOUNTER — TELEPHONE (OUTPATIENT)
Dept: INTERNAL MEDICINE CLINIC | Facility: CLINIC | Age: 65
End: 2025-04-08

## 2025-04-08 NOTE — TELEPHONE ENCOUNTER
Pt called to obtain the certification # of his ophthalmologist referral as he was being asked at the ophthalmologist office. I let him know that the referral was still showing in open status and advised him to reach out to his insurance to see what was taking so long. Also provided pt with the referral dept. phone number so he may f/u.

## 2025-04-11 ENCOUNTER — TELEPHONE (OUTPATIENT)
Dept: HEMATOLOGY ONCOLOGY | Facility: MEDICAL CENTER | Age: 65
End: 2025-04-11
Payer: COMMERCIAL

## 2025-04-11 NOTE — TELEPHONE ENCOUNTER
Left message to have patient call [facility] Hematology at [phone] to reschedule 5/16/2025 appointment. Provider will be out of the office.

## 2025-04-17 ENCOUNTER — TELEPHONE (OUTPATIENT)
Dept: INTERNAL MEDICINE CLINIC | Facility: CLINIC | Age: 65
End: 2025-04-17

## 2025-05-16 ENCOUNTER — HOSPITAL ENCOUNTER (OUTPATIENT)
Dept: LAB | Facility: MEDICAL CENTER | Age: 65
End: 2025-05-16
Attending: STUDENT IN AN ORGANIZED HEALTH CARE EDUCATION/TRAINING PROGRAM
Payer: COMMERCIAL

## 2025-05-16 ENCOUNTER — HOSPITAL ENCOUNTER (OUTPATIENT)
Dept: HEMATOLOGY ONCOLOGY | Facility: MEDICAL CENTER | Age: 65
End: 2025-05-16
Attending: STUDENT IN AN ORGANIZED HEALTH CARE EDUCATION/TRAINING PROGRAM
Payer: COMMERCIAL

## 2025-05-16 VITALS
HEART RATE: 65 BPM | DIASTOLIC BLOOD PRESSURE: 64 MMHG | TEMPERATURE: 98.1 F | SYSTOLIC BLOOD PRESSURE: 112 MMHG | HEIGHT: 61 IN | BODY MASS INDEX: 31.53 KG/M2 | WEIGHT: 167 LBS | OXYGEN SATURATION: 95 %

## 2025-05-16 DIAGNOSIS — D75.839 THROMBOCYTOSIS: Primary | ICD-10-CM

## 2025-05-16 DIAGNOSIS — I10 PRIMARY HYPERTENSION: Chronic | ICD-10-CM

## 2025-05-16 DIAGNOSIS — D75.839 THROMBOCYTOSIS: ICD-10-CM

## 2025-05-16 DIAGNOSIS — J45.909 UNCOMPLICATED ASTHMA, UNSPECIFIED ASTHMA SEVERITY, UNSPECIFIED WHETHER PERSISTENT: Chronic | ICD-10-CM

## 2025-05-16 PROCEDURE — 99204 OFFICE O/P NEW MOD 45 MIN: CPT | Performed by: STUDENT IN AN ORGANIZED HEALTH CARE EDUCATION/TRAINING PROGRAM

## 2025-05-16 PROCEDURE — 99212 OFFICE O/P EST SF 10 MIN: CPT | Performed by: STUDENT IN AN ORGANIZED HEALTH CARE EDUCATION/TRAINING PROGRAM

## 2025-05-16 ASSESSMENT — ENCOUNTER SYMPTOMS
ORTHOPNEA: 0
NAUSEA: 0
CHILLS: 0
DIZZINESS: 0
BLURRED VISION: 0
SENSORY CHANGE: 0
VOMITING: 0
SORE THROAT: 0
COUGH: 0
FOCAL WEAKNESS: 0
HEARTBURN: 0
SPUTUM PRODUCTION: 0
WHEEZING: 0
NECK PAIN: 0
WEIGHT LOSS: 0
PALPITATIONS: 0
TINGLING: 0
BRUISES/BLEEDS EASILY: 0
HEADACHES: 0
DEPRESSION: 0
FEVER: 0
ABDOMINAL PAIN: 0
MEMORY LOSS: 0
SHORTNESS OF BREATH: 0
TREMORS: 0

## 2025-05-16 NOTE — PROGRESS NOTES
Consult Note: Hematology/Oncology     Primary Care:  Stephen Ferguson M.D.    Chief Complaint   Patient presents with    New Patient     Thrombocytosis        Current Treatment: None    Prior Treatment: none    Subjective:   History of Presenting Illness:  Kb Guy is a 64 y.o. male who presents today with a longstanding problem of thrombocytosis.    On review of his labs since 2017 patient has had an elevated platelet count.    Patient denies any blood clots    He denies any iron deficiency anemia.     He denies a splenectomy.     He denies any fever, chills night sweats. No history of blood clots.  He denies any blood cancers in the family.  He has no chronic medical conditions. He denies any new medications.     He denies any vasomotor symptoms (eg, erythromelalgia, flushing, pruritus),     He reports he feels perfectly healthy.       Past Medical History[1]     Past Surgical History[2]    Social History[3]     No family history on file.    Allergies[4]    Current Medications[5]    Review of Systems   Constitutional:  Negative for chills, fever, malaise/fatigue and weight loss.   HENT:  Negative for congestion, ear pain, nosebleeds and sore throat.    Eyes:  Negative for blurred vision.   Respiratory:  Negative for cough, sputum production, shortness of breath and wheezing.    Cardiovascular:  Negative for chest pain, palpitations, orthopnea and leg swelling.   Gastrointestinal:  Negative for abdominal pain, heartburn, nausea and vomiting.   Genitourinary:  Negative for dysuria, frequency and urgency.   Musculoskeletal:  Negative for neck pain.   Neurological:  Negative for dizziness, tingling, tremors, sensory change, focal weakness and headaches.   Endo/Heme/Allergies:  Does not bruise/bleed easily.   Psychiatric/Behavioral:  Negative for depression, memory loss and suicidal ideas.    All other systems reviewed and are negative.      Problem list, medications, and allergies reviewed by myself  "today in Epic.     Objective:     Vitals:    05/16/25 1306   BP: 112/64   BP Location: Left arm   Patient Position: Sitting   BP Cuff Size: Adult   Pulse: 65   Temp: 36.7 °C (98.1 °F)   TempSrc: Temporal   SpO2: 95%   Weight: 75.8 kg (167 lb)   Height: 1.549 m (5' 0.98\")       DESC; KARNOFSKY SCALE WITH ECOG EQUIVALENT: 100, Fully active, able to carry on all pre-disease performed without restriction (ECOG equivalent 0)    DISTRESS LEVEL: no acute distress    Physical Exam  Constitutional:       General: He is not in acute distress.     Appearance: Normal appearance.   HENT:      Head: Normocephalic and atraumatic.      Nose: Nose normal. No congestion.      Mouth/Throat:      Mouth: Mucous membranes are moist.      Pharynx: Oropharynx is clear.   Eyes:      General: No scleral icterus.     Conjunctiva/sclera: Conjunctivae normal.      Pupils: Pupils are equal, round, and reactive to light.   Cardiovascular:      Rate and Rhythm: Normal rate and regular rhythm.      Pulses: Normal pulses.      Heart sounds: No murmur heard.     No friction rub.   Pulmonary:      Effort: Pulmonary effort is normal. No respiratory distress.      Breath sounds: Normal breath sounds. No stridor. No wheezing or rales.   Chest:      Chest wall: No tenderness.   Abdominal:      General: Abdomen is flat. Bowel sounds are normal. There is no distension.      Palpations: Abdomen is soft. There is no mass.      Tenderness: There is no abdominal tenderness. There is no guarding or rebound.   Musculoskeletal:         General: No swelling, tenderness or deformity. Normal range of motion.      Cervical back: Normal range of motion and neck supple. No rigidity or tenderness.      Right lower leg: No edema.      Left lower leg: No edema.   Skin:     General: Skin is warm.      Coloration: Skin is not jaundiced or pale.      Findings: No bruising or rash.   Neurological:      General: No focal deficit present.      Mental Status: He is alert and " oriented to person, place, and time. Mental status is at baseline.      Motor: No weakness.   Psychiatric:         Mood and Affect: Mood normal.         Behavior: Behavior normal.         Thought Content: Thought content normal.         Judgment: Judgment normal.         Labs:   Most recent labs reviewed.  Please see the lab tab of chart review    Imaging:   Most recent images below have been independently reviewed by me.  Please see the imaging tab of chart review      Assessment/Plan:     Mr. Guy is a 63 yo M who presents today with thrombocytosis.    On review of his labs, he has had longstanding thrombocytosis.  On most recent labs it is slightly higher from mid 400s to now mid 500s.  We discussed the causes for thrombocytosis.  We discussed primary reasons for secondary reasons.  We discussed the primary reasons are secondary to Myeloproliferative neoplasm however he does not have any signs or symptoms of a myeloproliferative neoplasm.  I explained to him that not always indications that he does not have a myeloproliferative neoplasm.  We discussed secondary causes Which include radioactive thrombocytosis, medications, trauma.  We discussed iron deficiency anemia.    Plan  - CBC  - CMP   -iron panel  - ferritin  -JAK2  - CALR  - MPL    2. HTN  -continue medication per primary    3. Asthma  -well controlled with current medication regimen    Patient has 1 acute illness that has unknown prognosis.  I reviewed several labs and clinic notes from the other provider.  I have ordered multiple lab tests to workup this acute illness.  He has 2 chronic medical conditions that we addressed today as well.    Any questions and concerns raised by the patient were addressed and answered. Patient denies any further questions.  Patient encouraged to call the office with any concerns or issues.     Arely Tobar M.D.  Hematology/Oncology                   [1]   Past Medical History:  Diagnosis Date    Hypertension      Sleep apnea    [2]   Past Surgical History:  Procedure Laterality Date    SINUSCOPE     [3]   Social History  Tobacco Use    Smoking status: Former    Smokeless tobacco: Never   Substance Use Topics    Alcohol use: No    Drug use: No   [4] No Known Allergies  [5]   Current Outpatient Medications   Medication Sig Dispense Refill    NS SOLN 60 mL with albuterol 2.5 mg/0.5 mL NEBU 5 mL 5 mg/hr by Nebulization route.      valsartan-hydrochlorothiazide (DIOVAN-HCT) 160-12.5 MG per tablet       oxycodone immediate-release (ROXICODONE) 5 MG Tab Take 1 Tab by mouth every 8 hours as needed for Severe Pain. 10 Tab 0    amlodipine (NORVASC) 10 MG Tab Take 10 mg by mouth every day.      cyanocobalamin (VITAMIN B12) 1000 MCG Tab Take 1,000 mcg by mouth every day.      Non Formulary Request Take 1 Tab by mouth every day. Indications: detox multi vit for weight loss      Non Formulary Request Take 1 Tab by mouth every day. Indications: multi vitamin for weight loss      aspirin EC (ECOTRIN) 81 MG TBEC Take 81 mg by mouth every day.      azithromycin (ZITHROMAX) 250 MG Tab Take 2 tabs today, then 1 tab daily til completed. (Patient not taking: Reported on 5/16/2025) 6 Tab 0    omeprazole (PRILOSEC) 40 MG delayed-release capsule Take 1 Cap by mouth every day. (Patient not taking: Reported on 5/16/2025) 30 Cap 0    budesonide-formoterol (SYMBICORT) 160-4.5 MCG/ACT AERO Inhale 2 Puffs by mouth 2 Times a Day. (Patient not taking: Reported on 5/16/2025)       No current facility-administered medications for this encounter.

## 2025-05-30 ENCOUNTER — HOSPITAL ENCOUNTER (OUTPATIENT)
Dept: LAB | Facility: MEDICAL CENTER | Age: 65
End: 2025-05-30
Attending: STUDENT IN AN ORGANIZED HEALTH CARE EDUCATION/TRAINING PROGRAM
Payer: COMMERCIAL

## 2025-05-30 DIAGNOSIS — D75.839 THROMBOCYTOSIS: ICD-10-CM

## 2025-05-30 LAB — SPECIMEN SOURCE: NORMAL

## 2025-05-30 PROCEDURE — 81270 JAK2 GENE: CPT

## 2025-05-30 PROCEDURE — 81206 BCR/ABL1 GENE MAJOR BP: CPT

## 2025-05-30 PROCEDURE — 36415 COLL VENOUS BLD VENIPUNCTURE: CPT

## 2025-05-30 PROCEDURE — 81207 BCR/ABL1 GENE MINOR BP: CPT

## 2025-05-30 PROCEDURE — 81208 BCR/ABL1 GENE OTHER BP: CPT

## 2025-06-04 LAB
JAK2 P.V617F BLD/T QL: DETECTED
SPECIMEN SOURCE: ABNORMAL
SPECIMEN SOURCE: NORMAL
T(9;22)(ABL1,BCR) BLD/T QL: NOT DETECTED

## 2025-06-06 ENCOUNTER — HOSPITAL ENCOUNTER (OUTPATIENT)
Dept: HEMATOLOGY ONCOLOGY | Facility: MEDICAL CENTER | Age: 65
End: 2025-06-06
Attending: STUDENT IN AN ORGANIZED HEALTH CARE EDUCATION/TRAINING PROGRAM
Payer: COMMERCIAL

## 2025-06-06 DIAGNOSIS — D75.839 THROMBOCYTOSIS: Primary | ICD-10-CM

## 2025-06-06 PROCEDURE — 99214 OFFICE O/P EST MOD 30 MIN: CPT | Mod: 95 | Performed by: STUDENT IN AN ORGANIZED HEALTH CARE EDUCATION/TRAINING PROGRAM

## 2025-06-06 RX ORDER — HYDROXYUREA 500 MG/1
500 CAPSULE ORAL DAILY
Qty: 30 CAPSULE | Refills: 0 | Status: SHIPPED | OUTPATIENT
Start: 2025-06-06 | End: 2025-07-06

## 2025-06-06 ASSESSMENT — ENCOUNTER SYMPTOMS
BLURRED VISION: 0
HEADACHES: 0
NAUSEA: 0
NECK PAIN: 0
FEVER: 0
SHORTNESS OF BREATH: 0
ORTHOPNEA: 0
TREMORS: 0
DEPRESSION: 0
DIZZINESS: 0
WEIGHT LOSS: 0
MEMORY LOSS: 0
FOCAL WEAKNESS: 0
VOMITING: 0
COUGH: 0
BRUISES/BLEEDS EASILY: 0
CHILLS: 0
WHEEZING: 0
PALPITATIONS: 0
SORE THROAT: 0
ABDOMINAL PAIN: 0
SENSORY CHANGE: 0
SPUTUM PRODUCTION: 0
TINGLING: 0
HEARTBURN: 0

## 2025-06-06 NOTE — PROGRESS NOTES
Consult Note: Hematology/Oncology     Primary Care:  Stephen Ferguson M.D.    Chief Complaint   Patient presents with    Follow-Up     Thrombocytosis        Current Treatment: None    Prior Treatment: none    Subjective:   History of Presenting Illness:  Kb Guy is a 64 y.o. male who presents today with a longstanding problem of thrombocytosis.    On review of his labs since 2017 patient has had an elevated platelet count.    Patient denies any blood clots    He denies any iron deficiency anemia.     He denies a splenectomy.     He denies any fever, chills night sweats. No history of blood clots.  He denies any blood cancers in the family.  He has no chronic medical conditions. He denies any new medications.     He denies any vasomotor symptoms (eg, erythromelalgia, flushing, pruritus),     He reports he feels perfectly healthy.     Interval History  Patient presents today to discuss his labs.  He reports that he is feeling well without any complaints      Past Medical History[1]     Past Surgical History[2]    Social History[3]     No family history on file.    Allergies[4]    Current Medications[5]    Review of Systems   Constitutional:  Negative for chills, fever, malaise/fatigue and weight loss.   HENT:  Negative for congestion, ear pain, nosebleeds and sore throat.    Eyes:  Negative for blurred vision.   Respiratory:  Negative for cough, sputum production, shortness of breath and wheezing.    Cardiovascular:  Negative for chest pain, palpitations, orthopnea and leg swelling.   Gastrointestinal:  Negative for abdominal pain, heartburn, nausea and vomiting.   Genitourinary:  Negative for dysuria, frequency and urgency.   Musculoskeletal:  Negative for neck pain.   Neurological:  Negative for dizziness, tingling, tremors, sensory change, focal weakness and headaches.   Endo/Heme/Allergies:  Does not bruise/bleed easily.   Psychiatric/Behavioral:  Negative for depression, memory loss and suicidal  ideas.    All other systems reviewed and are negative.      Problem list, medications, and allergies reviewed by myself today in Epic.     Objective:     There were no vitals filed for this visit.      DESC; KARNOFSKY SCALE WITH ECOG EQUIVALENT: 100, Fully active, able to carry on all pre-disease performed without restriction (ECOG equivalent 0)    DISTRESS LEVEL: no acute distress  NOT DONE DUE TO VIRTUAL VISIT  Physical Exam  Constitutional:       General: He is not in acute distress.     Appearance: Normal appearance.   HENT:      Head: Normocephalic and atraumatic.      Nose: Nose normal. No congestion.      Mouth/Throat:      Mouth: Mucous membranes are moist.      Pharynx: Oropharynx is clear.   Eyes:      General: No scleral icterus.     Conjunctiva/sclera: Conjunctivae normal.      Pupils: Pupils are equal, round, and reactive to light.   Cardiovascular:      Rate and Rhythm: Normal rate and regular rhythm.      Pulses: Normal pulses.      Heart sounds: No murmur heard.     No friction rub.   Pulmonary:      Effort: Pulmonary effort is normal. No respiratory distress.      Breath sounds: Normal breath sounds. No stridor. No wheezing or rales.   Chest:      Chest wall: No tenderness.   Abdominal:      General: Abdomen is flat. Bowel sounds are normal. There is no distension.      Palpations: Abdomen is soft. There is no mass.      Tenderness: There is no abdominal tenderness. There is no guarding or rebound.   Musculoskeletal:         General: No swelling, tenderness or deformity. Normal range of motion.      Cervical back: Normal range of motion and neck supple. No rigidity or tenderness.      Right lower leg: No edema.      Left lower leg: No edema.   Skin:     General: Skin is warm.      Coloration: Skin is not jaundiced or pale.      Findings: No bruising or rash.   Neurological:      General: No focal deficit present.      Mental Status: He is alert and oriented to person, place, and time. Mental status  is at baseline.      Motor: No weakness.   Psychiatric:         Mood and Affect: Mood normal.         Behavior: Behavior normal.         Thought Content: Thought content normal.         Judgment: Judgment normal.         Labs:   Most recent labs reviewed.  Please see the lab tab of chart review    Imaging:   Most recent images below have been independently reviewed by me.  Please see the imaging tab of chart review      Assessment/Plan:     Mr. Guy is a 65 yo M who presents today with essential thrombocytosis.    On review of his labs, he has had longstanding thrombocytosis.  On most recent labs it is slightly higher from mid 400s to now mid 500s.  We discussed the causes for thrombocytosis.  We discussed primary reasons for secondary reasons.  We discussed the primary reasons are secondary to Myeloproliferative neoplasm however he does not have any signs or symptoms of a myeloproliferative neoplasm.  I explained to him that not always indications that he does not have a myeloproliferative neoplasm.  We discussed secondary causes Which include radioactive thrombocytosis, medications, trauma.  We discussed iron deficiency anemia.    Today we reviewed his labs.  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    We discussed that he has high risk ET. We discussed that he should be treated with ASA 81mg and hydrea.  In a phase 3 trial of patients with high-risk ET, compared with no cytoreductive treatment, hydrea reduced arterial and venous thrombotic events.       Plan  -start hydrea 500mg daily  -ASA 81mg BID  -will get labs in 2 weeks to make sure he is tolerating this    2. HTN  -continue medication per primary    3. Asthma  -well controlled with current medication regimen    The patient is being treated for a life threatening malignancy.  We are using a treatment that poses a risk of toxicity that could lead to long term disruption of bodily function or death. He requires frequent  monitoring. He has 2 chronic medical conditions that we addressed today as well.     Any questions and concerns raised by the patient were addressed and answered. Patient denies any further questions.  Patient encouraged to call the office with any concerns or issues.     Arely Tobar M.D.  Hematology/Oncology      This evaluation was conducted via Teams using secure and encrypted videoconferencing technology. The patient was in their home in the [facility].    The patient's identity was confirmed and verbal consent was obtained for this virtual visit.               [1]   Past Medical History:  Diagnosis Date    Hypertension     Sleep apnea    [2]   Past Surgical History:  Procedure Laterality Date    SINUSCOPE     [3]   Social History  Tobacco Use    Smoking status: Former    Smokeless tobacco: Never   Substance Use Topics    Alcohol use: No    Drug use: No   [4] No Known Allergies  [5]   Current Outpatient Medications   Medication Sig Dispense Refill    NS SOLN 60 mL with albuterol 2.5 mg/0.5 mL NEBU 5 mL 5 mg/hr by Nebulization route.      valsartan-hydrochlorothiazide (DIOVAN-HCT) 160-12.5 MG per tablet       oxycodone immediate-release (ROXICODONE) 5 MG Tab Take 1 Tab by mouth every 8 hours as needed for Severe Pain. 10 Tab 0    amlodipine (NORVASC) 10 MG Tab Take 10 mg by mouth every day.      cyanocobalamin (VITAMIN B12) 1000 MCG Tab Take 1,000 mcg by mouth every day.      Non Formulary Request Take 1 Tab by mouth every day. Indications: detox multi vit for weight loss      Non Formulary Request Take 1 Tab by mouth every day. Indications: multi vitamin for weight loss      aspirin EC (ECOTRIN) 81 MG TBEC Take 81 mg by mouth every day.       No current facility-administered medications for this encounter.

## 2025-06-30 ENCOUNTER — TELEPHONE (OUTPATIENT)
Dept: HEMATOLOGY ONCOLOGY | Facility: MEDICAL CENTER | Age: 65
End: 2025-06-30
Payer: COMMERCIAL

## 2025-06-30 NOTE — TELEPHONE ENCOUNTER
This RN spoke to patient re: Hydrea refill. Patient states he has stopped the medication (unclear when) and will not continue until he gets a second opinion. Patient is not established with any other provider yet and he is unsure who he will be seeing for a second opinion. Rx refill for Hydrea refused. Dr. Tobar notified.

## (undated) NOTE — LETTER
4/17/2025    Gilberto Wolf  [address] [facility] [address]         Dear Gilberto,    This letter is to inform you that our office has made several attempts to reach you by phone without success.  We were attempting to contact you by phone regarding lab results    Please contact our office at the number listed below as soon as you receive this letter to discuss this issue and to make the necessary changes in our system to your contact information.  Thank you for your cooperation.        Sincerely,    Nupur Cartagena MD  [address] [facility] [address]  [place] [address]  Ph: [phone]  Fax: [phone]         Document electronically generated by:  Meera SINGH RN